# Patient Record
Sex: FEMALE | Race: WHITE | Employment: FULL TIME | URBAN - METROPOLITAN AREA
[De-identification: names, ages, dates, MRNs, and addresses within clinical notes are randomized per-mention and may not be internally consistent; named-entity substitution may affect disease eponyms.]

---

## 2021-02-03 ENCOUNTER — IMMUNIZATION (OUTPATIENT)
Dept: PRIMARY CARE CLINIC | Age: 81
End: 2021-02-03
Payer: COMMERCIAL

## 2021-02-03 PROCEDURE — 91301 COVID-19, MODERNA VACCINE 100MCG/0.5ML DOSE: CPT | Performed by: NURSE PRACTITIONER

## 2021-02-03 PROCEDURE — 0011A COVID-19, MODERNA VACCINE 100MCG/0.5ML DOSE: CPT | Performed by: NURSE PRACTITIONER

## 2021-03-03 ENCOUNTER — IMMUNIZATION (OUTPATIENT)
Dept: PRIMARY CARE CLINIC | Age: 81
End: 2021-03-03
Payer: COMMERCIAL

## 2021-03-03 PROCEDURE — 91301 COVID-19, MODERNA VACCINE 100MCG/0.5ML DOSE: CPT | Performed by: NURSE PRACTITIONER

## 2021-03-03 PROCEDURE — 0012A COVID-19, MODERNA VACCINE 100MCG/0.5ML DOSE: CPT | Performed by: NURSE PRACTITIONER

## 2023-04-27 ENCOUNTER — APPOINTMENT (OUTPATIENT)
Dept: GENERAL RADIOLOGY | Age: 83
DRG: 481 | End: 2023-04-27
Payer: MEDICARE

## 2023-04-27 ENCOUNTER — HOSPITAL ENCOUNTER (INPATIENT)
Age: 83
LOS: 4 days | Discharge: NURSING, REHAB OR LONG-TERM CARE | DRG: 481 | End: 2023-05-01
Attending: EMERGENCY MEDICINE | Admitting: INTERNAL MEDICINE
Payer: MEDICARE

## 2023-04-27 ENCOUNTER — APPOINTMENT (OUTPATIENT)
Dept: CT IMAGING | Age: 83
DRG: 481 | End: 2023-04-27
Payer: MEDICARE
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
## 2023-04-27 DIAGNOSIS — S72.141A CLOSED INTERTROCHANTERIC FRACTURE OF HIP, RIGHT, INITIAL ENCOUNTER (HCC): ICD-10-CM

## 2023-04-27 DIAGNOSIS — S72.001A CLOSED FRACTURE OF RIGHT HIP, INITIAL ENCOUNTER (HCC): ICD-10-CM

## 2023-04-27 DIAGNOSIS — G89.18 POST-OP PAIN: ICD-10-CM

## 2023-04-27 LAB
ALBUMIN SERPL-MCNC: 3.5 G/DL (ref 3.5–5.2)
ALP SERPL-CCNC: 201 U/L (ref 35–104)
ALT SERPL-CCNC: 81 U/L (ref 0–32)
ANION GAP SERPL CALCULATED.3IONS-SCNC: 5 MMOL/L (ref 7–16)
AST SERPL-CCNC: 75 U/L (ref 0–31)
BASOPHILS # BLD: 0.06 E9/L (ref 0–0.2)
BASOPHILS NFR BLD: 0.7 % (ref 0–2)
BILIRUB SERPL-MCNC: 0.4 MG/DL (ref 0–1.2)
BUN SERPL-MCNC: 16 MG/DL (ref 6–23)
CALCIUM SERPL-MCNC: 10 MG/DL (ref 8.6–10.2)
CHLORIDE SERPL-SCNC: 104 MMOL/L (ref 98–107)
CO2 SERPL-SCNC: 30 MMOL/L (ref 22–29)
CREAT SERPL-MCNC: 0.7 MG/DL (ref 0.5–1)
EOSINOPHIL # BLD: 0.04 E9/L (ref 0.05–0.5)
EOSINOPHIL NFR BLD: 0.4 % (ref 0–6)
ERYTHROCYTE [DISTWIDTH] IN BLOOD BY AUTOMATED COUNT: 13.5 FL (ref 11.5–15)
GLUCOSE SERPL-MCNC: 135 MG/DL (ref 74–99)
HCT VFR BLD AUTO: 40.6 % (ref 34–48)
HGB BLD-MCNC: 12.9 G/DL (ref 11.5–15.5)
IMM GRANULOCYTES # BLD: 0.04 E9/L
IMM GRANULOCYTES NFR BLD: 0.4 % (ref 0–5)
INR BLD: 1
LYMPHOCYTES # BLD: 1.13 E9/L (ref 1.5–4)
LYMPHOCYTES NFR BLD: 12.5 % (ref 20–42)
MCH RBC QN AUTO: 31.4 PG (ref 26–35)
MCHC RBC AUTO-ENTMCNC: 31.8 % (ref 32–34.5)
MCV RBC AUTO: 98.8 FL (ref 80–99.9)
MONOCYTES # BLD: 0.77 E9/L (ref 0.1–0.95)
MONOCYTES NFR BLD: 8.5 % (ref 2–12)
NEUTROPHILS # BLD: 7 E9/L (ref 1.8–7.3)
NEUTS SEG NFR BLD: 77.5 % (ref 43–80)
PLATELET # BLD AUTO: 255 E9/L (ref 130–450)
PMV BLD AUTO: 8.2 FL (ref 7–12)
POTASSIUM SERPL-SCNC: 3.8 MMOL/L (ref 3.5–5)
PROT SERPL-MCNC: 5.5 G/DL (ref 6.4–8.3)
PROTHROMBIN TIME: 11.4 SEC (ref 9.3–12.4)
RBC # BLD AUTO: 4.11 E12/L (ref 3.5–5.5)
SODIUM SERPL-SCNC: 139 MMOL/L (ref 132–146)
WBC # BLD: 9 E9/L (ref 4.5–11.5)

## 2023-04-27 PROCEDURE — 70450 CT HEAD/BRAIN W/O DYE: CPT

## 2023-04-27 PROCEDURE — 73552 X-RAY EXAM OF FEMUR 2/>: CPT

## 2023-04-27 PROCEDURE — 99231 SBSQ HOSP IP/OBS SF/LOW 25: CPT | Performed by: ORTHOPAEDIC SURGERY

## 2023-04-27 PROCEDURE — 99285 EMERGENCY DEPT VISIT HI MDM: CPT

## 2023-04-27 PROCEDURE — 72125 CT NECK SPINE W/O DYE: CPT

## 2023-04-27 PROCEDURE — 1200000000 HC SEMI PRIVATE

## 2023-04-27 PROCEDURE — 80053 COMPREHEN METABOLIC PANEL: CPT

## 2023-04-27 PROCEDURE — 85025 COMPLETE CBC W/AUTO DIFF WBC: CPT

## 2023-04-27 PROCEDURE — 85610 PROTHROMBIN TIME: CPT

## 2023-04-27 PROCEDURE — 71045 X-RAY EXAM CHEST 1 VIEW: CPT

## 2023-04-27 PROCEDURE — 73502 X-RAY EXAM HIP UNI 2-3 VIEWS: CPT

## 2023-04-27 RX ORDER — 0.9 % SODIUM CHLORIDE 0.9 %
500 INTRAVENOUS SOLUTION INTRAVENOUS ONCE
Status: DISCONTINUED | OUTPATIENT
Start: 2023-04-27 | End: 2023-04-28

## 2023-04-27 RX ORDER — FENTANYL CITRATE 0.05 MG/ML
25 INJECTION, SOLUTION INTRAMUSCULAR; INTRAVENOUS ONCE
Status: DISCONTINUED | OUTPATIENT
Start: 2023-04-28 | End: 2023-04-27 | Stop reason: SDUPTHER

## 2023-04-27 RX ORDER — FENTANYL CITRATE 0.05 MG/ML
25 INJECTION, SOLUTION INTRAMUSCULAR; INTRAVENOUS ONCE
Status: COMPLETED | OUTPATIENT
Start: 2023-04-28 | End: 2023-04-28

## 2023-04-27 ASSESSMENT — PAIN DESCRIPTION - DESCRIPTORS: DESCRIPTORS: STABBING

## 2023-04-27 ASSESSMENT — PAIN DESCRIPTION - ORIENTATION: ORIENTATION: RIGHT

## 2023-04-27 ASSESSMENT — PAIN SCALES - GENERAL: PAINLEVEL_OUTOF10: 10

## 2023-04-27 ASSESSMENT — PAIN - FUNCTIONAL ASSESSMENT
PAIN_FUNCTIONAL_ASSESSMENT: NONE - DENIES PAIN
PAIN_FUNCTIONAL_ASSESSMENT: 0-10

## 2023-04-27 ASSESSMENT — PAIN DESCRIPTION - LOCATION: LOCATION: HIP

## 2023-04-28 ENCOUNTER — APPOINTMENT (OUTPATIENT)
Dept: GENERAL RADIOLOGY | Age: 83
DRG: 481 | End: 2023-04-28
Payer: MEDICARE

## 2023-04-28 ENCOUNTER — ANESTHESIA EVENT (OUTPATIENT)
Dept: OPERATING ROOM | Age: 83
End: 2023-04-28
Payer: MEDICARE

## 2023-04-28 ENCOUNTER — ANESTHESIA (OUTPATIENT)
Dept: OPERATING ROOM | Age: 83
End: 2023-04-28
Payer: MEDICARE

## 2023-04-28 LAB
ABO + RH BLD: NORMAL
ALBUMIN SERPL-MCNC: 3.4 G/DL (ref 3.5–5.2)
ALP SERPL-CCNC: 185 U/L (ref 35–104)
ALT SERPL-CCNC: 72 U/L (ref 0–32)
ANION GAP SERPL CALCULATED.3IONS-SCNC: 6 MMOL/L (ref 7–16)
AST SERPL-CCNC: 54 U/L (ref 0–31)
BASOPHILS # BLD: 0.04 E9/L (ref 0–0.2)
BASOPHILS NFR BLD: 0.4 % (ref 0–2)
BILIRUB SERPL-MCNC: 0.4 MG/DL (ref 0–1.2)
BLD GP AB SCN SERPL QL: NORMAL
BUN SERPL-MCNC: 15 MG/DL (ref 6–23)
CALCIUM SERPL-MCNC: 9.8 MG/DL (ref 8.6–10.2)
CHLORIDE SERPL-SCNC: 106 MMOL/L (ref 98–107)
CO2 SERPL-SCNC: 30 MMOL/L (ref 22–29)
CREAT SERPL-MCNC: 0.5 MG/DL (ref 0.5–1)
EKG ATRIAL RATE: 75 BPM
EKG P AXIS: 86 DEGREES
EKG P-R INTERVAL: 176 MS
EKG Q-T INTERVAL: 386 MS
EKG QRS DURATION: 90 MS
EKG QTC CALCULATION (BAZETT): 431 MS
EKG R AXIS: 51 DEGREES
EKG T AXIS: 81 DEGREES
EKG VENTRICULAR RATE: 75 BPM
EOSINOPHIL # BLD: 0 E9/L (ref 0.05–0.5)
EOSINOPHIL NFR BLD: 0 % (ref 0–6)
ERYTHROCYTE [DISTWIDTH] IN BLOOD BY AUTOMATED COUNT: 13.2 FL (ref 11.5–15)
GLUCOSE SERPL-MCNC: 130 MG/DL (ref 74–99)
HCT VFR BLD AUTO: 40.5 % (ref 34–48)
HGB BLD-MCNC: 13 G/DL (ref 11.5–15.5)
IMM GRANULOCYTES # BLD: 0.05 E9/L
IMM GRANULOCYTES NFR BLD: 0.5 % (ref 0–5)
LYMPHOCYTES # BLD: 0.91 E9/L (ref 1.5–4)
LYMPHOCYTES NFR BLD: 9.4 % (ref 20–42)
MAGNESIUM SERPL-MCNC: 2 MG/DL (ref 1.6–2.6)
MCH RBC QN AUTO: 31.9 PG (ref 26–35)
MCHC RBC AUTO-ENTMCNC: 32.1 % (ref 32–34.5)
MCV RBC AUTO: 99.3 FL (ref 80–99.9)
MONOCYTES # BLD: 0.72 E9/L (ref 0.1–0.95)
MONOCYTES NFR BLD: 7.5 % (ref 2–12)
NEUTROPHILS # BLD: 7.93 E9/L (ref 1.8–7.3)
NEUTS SEG NFR BLD: 82.2 % (ref 43–80)
PHOSPHATE SERPL-MCNC: 2.4 MG/DL (ref 2.5–4.5)
PLATELET # BLD AUTO: 267 E9/L (ref 130–450)
PMV BLD AUTO: 8.3 FL (ref 7–12)
POTASSIUM SERPL-SCNC: 4 MMOL/L (ref 3.5–5)
PROT SERPL-MCNC: 5.4 G/DL (ref 6.4–8.3)
RBC # BLD AUTO: 4.08 E12/L (ref 3.5–5.5)
SODIUM SERPL-SCNC: 142 MMOL/L (ref 132–146)
T4 FREE SERPL-MCNC: 1.03 NG/DL (ref 0.93–1.7)
TSH SERPL-MCNC: 9.14 UIU/ML (ref 0.27–4.2)
WBC # BLD: 9.7 E9/L (ref 4.5–11.5)

## 2023-04-28 PROCEDURE — 27245 TREAT THIGH FRACTURE: CPT | Performed by: ORTHOPAEDIC SURGERY

## 2023-04-28 PROCEDURE — 6360000002 HC RX W HCPCS: Performed by: INTERNAL MEDICINE

## 2023-04-28 PROCEDURE — 36415 COLL VENOUS BLD VENIPUNCTURE: CPT

## 2023-04-28 PROCEDURE — 93010 ELECTROCARDIOGRAM REPORT: CPT | Performed by: INTERNAL MEDICINE

## 2023-04-28 PROCEDURE — 2580000003 HC RX 258: Performed by: ORTHOPAEDIC SURGERY

## 2023-04-28 PROCEDURE — 93005 ELECTROCARDIOGRAM TRACING: CPT | Performed by: ORTHOPAEDIC SURGERY

## 2023-04-28 PROCEDURE — 85025 COMPLETE CBC W/AUTO DIFF WBC: CPT

## 2023-04-28 PROCEDURE — 6360000002 HC RX W HCPCS: Performed by: NURSE ANESTHETIST, CERTIFIED REGISTERED

## 2023-04-28 PROCEDURE — 7100000001 HC PACU RECOVERY - ADDTL 15 MIN: Performed by: ORTHOPAEDIC SURGERY

## 2023-04-28 PROCEDURE — 2709999900 HC NON-CHARGEABLE SUPPLY: Performed by: ORTHOPAEDIC SURGERY

## 2023-04-28 PROCEDURE — 86901 BLOOD TYPING SEROLOGIC RH(D): CPT

## 2023-04-28 PROCEDURE — 6370000000 HC RX 637 (ALT 250 FOR IP): Performed by: ORTHOPAEDIC SURGERY

## 2023-04-28 PROCEDURE — 6370000000 HC RX 637 (ALT 250 FOR IP)

## 2023-04-28 PROCEDURE — 3209999900 FLUORO FOR SURGICAL PROCEDURES

## 2023-04-28 PROCEDURE — 2720000010 HC SURG SUPPLY STERILE: Performed by: ORTHOPAEDIC SURGERY

## 2023-04-28 PROCEDURE — 84439 ASSAY OF FREE THYROXINE: CPT

## 2023-04-28 PROCEDURE — 6360000002 HC RX W HCPCS: Performed by: ANESTHESIOLOGY

## 2023-04-28 PROCEDURE — 6360000002 HC RX W HCPCS: Performed by: ORTHOPAEDIC SURGERY

## 2023-04-28 PROCEDURE — 6360000002 HC RX W HCPCS

## 2023-04-28 PROCEDURE — 84443 ASSAY THYROID STIM HORMONE: CPT

## 2023-04-28 PROCEDURE — 3600000014 HC SURGERY LEVEL 4 ADDTL 15MIN: Performed by: ORTHOPAEDIC SURGERY

## 2023-04-28 PROCEDURE — 0QH606Z INSERTION OF INTRAMEDULLARY INTERNAL FIXATION DEVICE INTO RIGHT UPPER FEMUR, OPEN APPROACH: ICD-10-PCS | Performed by: ORTHOPAEDIC SURGERY

## 2023-04-28 PROCEDURE — 83735 ASSAY OF MAGNESIUM: CPT

## 2023-04-28 PROCEDURE — 3700000001 HC ADD 15 MINUTES (ANESTHESIA): Performed by: ORTHOPAEDIC SURGERY

## 2023-04-28 PROCEDURE — 93005 ELECTROCARDIOGRAM TRACING: CPT

## 2023-04-28 PROCEDURE — 80053 COMPREHEN METABOLIC PANEL: CPT

## 2023-04-28 PROCEDURE — C1713 ANCHOR/SCREW BN/BN,TIS/BN: HCPCS | Performed by: ORTHOPAEDIC SURGERY

## 2023-04-28 PROCEDURE — 2580000003 HC RX 258: Performed by: INTERNAL MEDICINE

## 2023-04-28 PROCEDURE — 2580000003 HC RX 258: Performed by: NURSE ANESTHETIST, CERTIFIED REGISTERED

## 2023-04-28 PROCEDURE — 3600000004 HC SURGERY LEVEL 4 BASE: Performed by: ORTHOPAEDIC SURGERY

## 2023-04-28 PROCEDURE — 84100 ASSAY OF PHOSPHORUS: CPT

## 2023-04-28 PROCEDURE — 86900 BLOOD TYPING SEROLOGIC ABO: CPT

## 2023-04-28 PROCEDURE — 86850 RBC ANTIBODY SCREEN: CPT

## 2023-04-28 PROCEDURE — C1769 GUIDE WIRE: HCPCS | Performed by: ORTHOPAEDIC SURGERY

## 2023-04-28 PROCEDURE — 1200000000 HC SEMI PRIVATE

## 2023-04-28 PROCEDURE — 7100000000 HC PACU RECOVERY - FIRST 15 MIN: Performed by: ORTHOPAEDIC SURGERY

## 2023-04-28 PROCEDURE — 3700000000 HC ANESTHESIA ATTENDED CARE: Performed by: ORTHOPAEDIC SURGERY

## 2023-04-28 DEVICE — LONG NAIL KIT R1.5, TI, RIGHT
Type: IMPLANTABLE DEVICE | Site: HIP | Status: FUNCTIONAL
Brand: GAMMA

## 2023-04-28 DEVICE — LAG SCREW, TI
Type: IMPLANTABLE DEVICE | Site: HIP | Status: FUNCTIONAL
Brand: GAMMA

## 2023-04-28 DEVICE — LOCKING SCREW, FULLY THREADED: Type: IMPLANTABLE DEVICE | Site: HIP | Status: FUNCTIONAL

## 2023-04-28 RX ORDER — OXYCODONE HYDROCHLORIDE 5 MG/1
2.5 TABLET ORAL EVERY 6 HOURS PRN
Qty: 10 TABLET | Refills: 0 | DISCHARGE
Start: 2023-04-28 | End: 2023-05-03

## 2023-04-28 RX ORDER — PROPOFOL 10 MG/ML
INJECTION, EMULSION INTRAVENOUS CONTINUOUS PRN
Status: DISCONTINUED | OUTPATIENT
Start: 2023-04-28 | End: 2023-04-28 | Stop reason: SDUPTHER

## 2023-04-28 RX ORDER — HYDRALAZINE HYDROCHLORIDE 20 MG/ML
INJECTION INTRAMUSCULAR; INTRAVENOUS
Status: COMPLETED
Start: 2023-04-28 | End: 2023-04-28

## 2023-04-28 RX ORDER — KETOROLAC TROMETHAMINE 15 MG/ML
15 INJECTION, SOLUTION INTRAMUSCULAR; INTRAVENOUS
Status: DISCONTINUED | OUTPATIENT
Start: 2023-04-28 | End: 2023-04-28 | Stop reason: HOSPADM

## 2023-04-28 RX ORDER — CEFAZOLIN SODIUM 2 G/50ML
SOLUTION INTRAVENOUS PRN
Status: DISCONTINUED | OUTPATIENT
Start: 2023-04-28 | End: 2023-04-28 | Stop reason: SDUPTHER

## 2023-04-28 RX ORDER — WATER 1000 ML/1000ML
INJECTION, SOLUTION INTRAVENOUS
Status: DISPENSED
Start: 2023-04-28 | End: 2023-04-28

## 2023-04-28 RX ORDER — SODIUM CHLORIDE 9 MG/ML
INJECTION, SOLUTION INTRAVENOUS PRN
Status: DISCONTINUED | OUTPATIENT
Start: 2023-04-28 | End: 2023-05-01 | Stop reason: HOSPADM

## 2023-04-28 RX ORDER — SODIUM CHLORIDE 9 MG/ML
INJECTION, SOLUTION INTRAVENOUS CONTINUOUS PRN
Status: DISCONTINUED | OUTPATIENT
Start: 2023-04-28 | End: 2023-04-28 | Stop reason: SDUPTHER

## 2023-04-28 RX ORDER — LEVOTHYROXINE SODIUM 0.07 MG/1
75 TABLET ORAL DAILY
Status: DISCONTINUED | OUTPATIENT
Start: 2023-04-28 | End: 2023-04-28

## 2023-04-28 RX ORDER — CEFAZOLIN 2 G/1
INJECTION, POWDER, FOR SOLUTION INTRAMUSCULAR; INTRAVENOUS
Status: DISPENSED
Start: 2023-04-28 | End: 2023-04-28

## 2023-04-28 RX ORDER — ACETAMINOPHEN 650 MG/1
650 SUPPOSITORY RECTAL EVERY 6 HOURS PRN
Status: DISCONTINUED | OUTPATIENT
Start: 2023-04-28 | End: 2023-04-28

## 2023-04-28 RX ORDER — OXYCODONE HYDROCHLORIDE 5 MG/1
2.5 TABLET ORAL EVERY 6 HOURS PRN
Status: DISCONTINUED | OUTPATIENT
Start: 2023-04-28 | End: 2023-05-01 | Stop reason: HOSPADM

## 2023-04-28 RX ORDER — SODIUM CHLORIDE 0.9 % (FLUSH) 0.9 %
5-40 SYRINGE (ML) INJECTION PRN
Status: DISCONTINUED | OUTPATIENT
Start: 2023-04-28 | End: 2023-04-28 | Stop reason: HOSPADM

## 2023-04-28 RX ORDER — SODIUM CHLORIDE 9 MG/ML
INJECTION, SOLUTION INTRAVENOUS CONTINUOUS
Status: DISCONTINUED | OUTPATIENT
Start: 2023-04-28 | End: 2023-04-29 | Stop reason: SDUPTHER

## 2023-04-28 RX ORDER — LEVOTHYROXINE SODIUM 0.1 MG/1
100 TABLET ORAL DAILY
Status: DISCONTINUED | OUTPATIENT
Start: 2023-04-28 | End: 2023-05-01 | Stop reason: HOSPADM

## 2023-04-28 RX ORDER — LIDOCAINE HYDROCHLORIDE 20 MG/ML
INJECTION, SOLUTION INTRAVENOUS PRN
Status: DISCONTINUED | OUTPATIENT
Start: 2023-04-28 | End: 2023-04-28 | Stop reason: SDUPTHER

## 2023-04-28 RX ORDER — ACETAMINOPHEN 325 MG/1
650 TABLET ORAL EVERY 6 HOURS PRN
Status: DISCONTINUED | OUTPATIENT
Start: 2023-04-28 | End: 2023-05-01 | Stop reason: HOSPADM

## 2023-04-28 RX ORDER — DEXTROSE MONOHYDRATE 100 MG/ML
INJECTION, SOLUTION INTRAVENOUS CONTINUOUS PRN
Status: DISCONTINUED | OUTPATIENT
Start: 2023-04-28 | End: 2023-05-01 | Stop reason: HOSPADM

## 2023-04-28 RX ORDER — LEVOTHYROXINE SODIUM 0.07 MG/1
75 TABLET ORAL DAILY
Status: ON HOLD | COMMUNITY
Start: 2023-02-15 | End: 2023-05-01 | Stop reason: HOSPADM

## 2023-04-28 RX ORDER — ACETAMINOPHEN 325 MG/1
650 TABLET ORAL EVERY 6 HOURS PRN
Status: DISCONTINUED | OUTPATIENT
Start: 2023-04-28 | End: 2023-04-28

## 2023-04-28 RX ORDER — HYDRALAZINE HYDROCHLORIDE 20 MG/ML
5 INJECTION INTRAMUSCULAR; INTRAVENOUS EVERY 10 MIN PRN
Status: DISCONTINUED | OUTPATIENT
Start: 2023-04-28 | End: 2023-05-01 | Stop reason: HOSPADM

## 2023-04-28 RX ORDER — 0.9 % SODIUM CHLORIDE 0.9 %
1000 INTRAVENOUS SOLUTION INTRAVENOUS ONCE
Status: COMPLETED | OUTPATIENT
Start: 2023-04-28 | End: 2023-04-28

## 2023-04-28 RX ORDER — SODIUM CHLORIDE 0.9 % (FLUSH) 0.9 %
5-40 SYRINGE (ML) INJECTION EVERY 12 HOURS SCHEDULED
Status: DISCONTINUED | OUTPATIENT
Start: 2023-04-28 | End: 2023-05-01 | Stop reason: HOSPADM

## 2023-04-28 RX ORDER — ONDANSETRON 4 MG/1
4 TABLET, ORALLY DISINTEGRATING ORAL EVERY 8 HOURS PRN
Status: DISCONTINUED | OUTPATIENT
Start: 2023-04-28 | End: 2023-05-01 | Stop reason: HOSPADM

## 2023-04-28 RX ORDER — SODIUM CHLORIDE 0.9 % (FLUSH) 0.9 %
5-40 SYRINGE (ML) INJECTION EVERY 12 HOURS SCHEDULED
Status: DISCONTINUED | OUTPATIENT
Start: 2023-04-28 | End: 2023-04-28 | Stop reason: HOSPADM

## 2023-04-28 RX ORDER — SODIUM CHLORIDE 0.9 % (FLUSH) 0.9 %
5-40 SYRINGE (ML) INJECTION PRN
Status: DISCONTINUED | OUTPATIENT
Start: 2023-04-28 | End: 2023-05-01 | Stop reason: HOSPADM

## 2023-04-28 RX ORDER — FENTANYL CITRATE 0.05 MG/ML
25 INJECTION, SOLUTION INTRAMUSCULAR; INTRAVENOUS EVERY 5 MIN PRN
Status: DISCONTINUED | OUTPATIENT
Start: 2023-04-28 | End: 2023-04-28 | Stop reason: HOSPADM

## 2023-04-28 RX ORDER — SODIUM CHLORIDE 9 MG/ML
INJECTION, SOLUTION INTRAVENOUS PRN
Status: DISCONTINUED | OUTPATIENT
Start: 2023-04-28 | End: 2023-04-28 | Stop reason: HOSPADM

## 2023-04-28 RX ORDER — FENTANYL CITRATE 50 UG/ML
INJECTION, SOLUTION INTRAMUSCULAR; INTRAVENOUS PRN
Status: DISCONTINUED | OUTPATIENT
Start: 2023-04-28 | End: 2023-04-28 | Stop reason: SDUPTHER

## 2023-04-28 RX ORDER — MORPHINE SULFATE 2 MG/ML
2 INJECTION, SOLUTION INTRAMUSCULAR; INTRAVENOUS EVERY 4 HOURS PRN
Status: DISCONTINUED | OUTPATIENT
Start: 2023-04-28 | End: 2023-05-01 | Stop reason: HOSPADM

## 2023-04-28 RX ORDER — POLYETHYLENE GLYCOL 3350 17 G/17G
17 POWDER, FOR SOLUTION ORAL DAILY PRN
Status: DISCONTINUED | OUTPATIENT
Start: 2023-04-28 | End: 2023-04-28

## 2023-04-28 RX ORDER — PROPOFOL 10 MG/ML
INJECTION, EMULSION INTRAVENOUS PRN
Status: DISCONTINUED | OUTPATIENT
Start: 2023-04-28 | End: 2023-04-28 | Stop reason: SDUPTHER

## 2023-04-28 RX ORDER — POLYETHYLENE GLYCOL 3350 17 G/17G
17 POWDER, FOR SOLUTION ORAL DAILY PRN
Status: DISCONTINUED | OUTPATIENT
Start: 2023-04-28 | End: 2023-05-01 | Stop reason: HOSPADM

## 2023-04-28 RX ORDER — ONDANSETRON 2 MG/ML
4 INJECTION INTRAMUSCULAR; INTRAVENOUS EVERY 6 HOURS PRN
Status: DISCONTINUED | OUTPATIENT
Start: 2023-04-28 | End: 2023-05-01 | Stop reason: HOSPADM

## 2023-04-28 RX ADMIN — OXYCODONE HYDROCHLORIDE 2.5 MG: 5 TABLET ORAL at 21:21

## 2023-04-28 RX ADMIN — FENTANYL CITRATE 25 MCG: 50 INJECTION, SOLUTION INTRAMUSCULAR; INTRAVENOUS at 11:59

## 2023-04-28 RX ADMIN — LIDOCAINE HYDROCHLORIDE 50 MG: 20 INJECTION, SOLUTION INTRAVENOUS at 12:05

## 2023-04-28 RX ADMIN — ONDANSETRON 4 MG: 2 INJECTION INTRAMUSCULAR; INTRAVENOUS at 16:52

## 2023-04-28 RX ADMIN — PROPOFOL 20 MG: 10 INJECTION, EMULSION INTRAVENOUS at 12:22

## 2023-04-28 RX ADMIN — LEVOTHYROXINE SODIUM 100 MCG: 100 TABLET ORAL at 20:34

## 2023-04-28 RX ADMIN — CEFAZOLIN 2000 MG: 2 INJECTION, POWDER, FOR SOLUTION INTRAMUSCULAR; INTRAVENOUS at 20:34

## 2023-04-28 RX ADMIN — SODIUM CHLORIDE 1000 ML: 9 INJECTION, SOLUTION INTRAVENOUS at 02:47

## 2023-04-28 RX ADMIN — SODIUM CHLORIDE: 900 INJECTION, SOLUTION INTRAVENOUS at 11:10

## 2023-04-28 RX ADMIN — SERTRALINE 25 MG: 50 TABLET, FILM COATED ORAL at 20:34

## 2023-04-28 RX ADMIN — HYDRALAZINE HYDROCHLORIDE 5 MG: 20 INJECTION INTRAMUSCULAR; INTRAVENOUS at 14:40

## 2023-04-28 RX ADMIN — FENTANYL CITRATE 25 MCG: 0.05 INJECTION, SOLUTION INTRAMUSCULAR; INTRAVENOUS at 00:06

## 2023-04-28 RX ADMIN — PROPOFOL 30 MG: 10 INJECTION, EMULSION INTRAVENOUS at 12:05

## 2023-04-28 RX ADMIN — PHENYLEPHRINE HYDROCHLORIDE 200 MCG: 10 INJECTION INTRAVENOUS at 12:42

## 2023-04-28 RX ADMIN — HYDRALAZINE HYDROCHLORIDE 5 MG: 20 INJECTION INTRAMUSCULAR; INTRAVENOUS at 14:18

## 2023-04-28 RX ADMIN — PROPOFOL 20 MG: 10 INJECTION, EMULSION INTRAVENOUS at 12:14

## 2023-04-28 RX ADMIN — PROPOFOL INJECTABLE EMULSION 50 MCG/KG/MIN: 10 INJECTION, EMULSION INTRAVENOUS at 12:21

## 2023-04-28 RX ADMIN — FENTANYL CITRATE 25 MCG: 50 INJECTION, SOLUTION INTRAMUSCULAR; INTRAVENOUS at 12:19

## 2023-04-28 RX ADMIN — FENTANYL CITRATE 25 MCG: 50 INJECTION, SOLUTION INTRAMUSCULAR; INTRAVENOUS at 12:04

## 2023-04-28 RX ADMIN — CEFAZOLIN SODIUM 2000 MG: 2 SOLUTION INTRAVENOUS at 12:25

## 2023-04-28 RX ADMIN — PHENYLEPHRINE HYDROCHLORIDE 100 MCG: 10 INJECTION INTRAVENOUS at 12:39

## 2023-04-28 RX ADMIN — PROPOFOL 20 MG: 10 INJECTION, EMULSION INTRAVENOUS at 12:09

## 2023-04-28 RX ADMIN — FENTANYL CITRATE 25 MCG: 50 INJECTION, SOLUTION INTRAMUSCULAR; INTRAVENOUS at 12:08

## 2023-04-28 RX ADMIN — PROPOFOL 20 MG: 10 INJECTION, EMULSION INTRAVENOUS at 13:00

## 2023-04-28 ASSESSMENT — PAIN DESCRIPTION - ORIENTATION
ORIENTATION: RIGHT
ORIENTATION: RIGHT

## 2023-04-28 ASSESSMENT — LIFESTYLE VARIABLES
HOW OFTEN DO YOU HAVE A DRINK CONTAINING ALCOHOL: NEVER
HOW MANY STANDARD DRINKS CONTAINING ALCOHOL DO YOU HAVE ON A TYPICAL DAY: PATIENT DOES NOT DRINK

## 2023-04-28 ASSESSMENT — PAIN SCALES - PAIN ASSESSMENT IN ADVANCED DEMENTIA (PAINAD)
NEGVOCALIZATION: 0
BODYLANGUAGE: 0
FACIALEXPRESSION: 0
BREATHING: 0
CONSOLABILITY: 0
TOTALSCORE: 0

## 2023-04-28 ASSESSMENT — PAIN SCALES - GENERAL
PAINLEVEL_OUTOF10: 10
PAINLEVEL_OUTOF10: 7
PAINLEVEL_OUTOF10: 0

## 2023-04-28 ASSESSMENT — PAIN DESCRIPTION - LOCATION
LOCATION: HIP;LEG
LOCATION: HIP

## 2023-04-28 NOTE — ANESTHESIA PROCEDURE NOTES
Spinal Block    Patient location during procedure: OR  End time: 4/28/2023 12:19 PM  Reason for block: primary anesthetic and at surgeon's request  Staffing  Performed: anesthesiologist   Anesthesiologist: Rody Jacobs DO  Resident/CRNA: WENDI Aguayo CRNA  Spinal Block  Patient position: sitting  Prep: Betadine and site prepped and draped  Patient monitoring: cardiac monitor, continuous pulse ox, continuous capnometry, frequent blood pressure checks and oxygen  Approach: midline  Location: L3/L4  Guidance: paresthesia technique  Provider prep: mask and sterile gloves  Local infiltration: lidocaine  Needle  Needle type: Quincke   Needle gauge: 22 G  Needle length: 3.5 in  Assessment  Sensory level: T6  Swirl obtained: Yes  CSF: clear  Attempts: 2 (1st attempt failed to reach intrathecal space by CRNA, Dr. Arthur Pena successful)  Hemodynamics: stable  Preanesthetic Checklist  Completed: patient identified, IV checked, site marked, risks and benefits discussed, surgical/procedural consents, equipment checked, pre-op evaluation, timeout performed, anesthesia consent given, oxygen available, monitors applied/VS acknowledged, fire risk safety assessment completed and verbalized and blood product R/B/A discussed and consented

## 2023-04-28 NOTE — ANESTHESIA PRE PROCEDURE
Department of Anesthesiology  Preprocedure Note       Name:  Alicia Aranda   Age:  80 y.o.  :  1940                                          MRN:  [id number]         Date:  2023      Surgeon: Flex Medina): Tash Rodriguez DO    Procedure: Procedure(s):  RIGHT HIP CEPHALOMEDULLARY NAILING    Medications prior to admission:   Prior to Admission medications    Medication Sig Start Date End Date Taking?  Authorizing Provider   levothyroxine (SYNTHROID) 75 MCG tablet Take 1 tablet by mouth daily 2/15/23   Historical Provider, MD       Current medications:    Current Facility-Administered Medications   Medication Dose Route Frequency Provider Last Rate Last Admin    ceFAZolin (ANCEF) 2,000 mg in sterile water 20 mL IV syringe  2,000 mg IntraVENous On Call to [address] [place] [address], DO        glucose chewable tablet 16 g  4 tablet Oral PRN Mallorie Gey, DO        dextrose bolus 10% 125 mL  125 mL IntraVENous PRN Mallorie Gey, DO        Or    dextrose bolus 10% 250 mL  250 mL IntraVENous PRN Mallorie Gey, DO        glucagon (rDNA) injection 1 mg  1 mg SubCUTAneous PRN Mallorie Gey, DO        dextrose 10 % infusion   IntraVENous Continuous PRN Mallorie Gey, DO        sodium chloride flush 0.9 % injection 5-40 mL  5-40 mL IntraVENous 2 times per day Mallorie Gey, DO        sodium chloride flush 0.9 % injection 5-40 mL  5-40 mL IntraVENous PRN Mallorie Gey, DO        0.9 % sodium chloride infusion   IntraVENous PRN Mallorie Gey, DO        polyethylene glycol (GLYCOLAX) packet 17 g  17 g Oral Daily PRN Mallorie Gey, DO        acetaminophen (TYLENOL) tablet 650 mg  650 mg Oral Q6H PRN Mallorie Gey, DO        Or    acetaminophen (TYLENOL) suppository 650 mg  650 mg Rectal Q6H PRN Mallorie Gey, DO        levothyroxine (SYNTHROID) tablet 100 mcg  100 mcg Oral Daily WENDI Carballo CNP        sertraline (ZOLOFT) tablet 25 mg  25 mg Oral Daily WENDI Carballo CNP

## 2023-04-28 NOTE — ANESTHESIA POSTPROCEDURE EVALUATION
Department of Anesthesiology  Postprocedure Note    Patient: Sam Floor  MRN: [id number]  YOB: 1940  Date of evaluation: 4/28/2023      Procedure Summary     Date: 04/28/23 Room / Location: [address] [place] [address] 06 / [facility]    Anesthesia Start: 1137 Anesthesia Stop: 1829    Procedure: RIGHT HIP CEPHALOMEDULLARY NAILING (Right: Hip) Diagnosis:       Closed fracture of right hip with routine healing, subsequent encounter      (Closed fracture of right hip with routine healing, subsequent encounter [S72.001D])    Surgeons: Salinas Saini DO Responsible Provider: Tawana Ahuja DO    Anesthesia Type: spinal ASA Status: 3          Anesthesia Type: No value filed.     Steve Phase I: Steve Score: 9    Steve Phase II:        Anesthesia Post Evaluation    Patient location during evaluation: PACU  Patient participation: complete - patient participated  Level of consciousness: awake and alert  Airway patency: patent  Nausea & Vomiting: no nausea and no vomiting  Complications: no  Cardiovascular status: hemodynamically stable  Respiratory status: acceptable  Hydration status: euvolemic

## 2023-04-29 LAB
ALBUMIN SERPL-MCNC: 2.9 G/DL (ref 3.5–5.2)
ALP SERPL-CCNC: 133 U/L (ref 35–104)
ALT SERPL-CCNC: 41 U/L (ref 0–32)
ANION GAP SERPL CALCULATED.3IONS-SCNC: 8 MMOL/L (ref 7–16)
AST SERPL-CCNC: 21 U/L (ref 0–31)
BASOPHILS # BLD: 0.02 E9/L (ref 0–0.2)
BASOPHILS NFR BLD: 0.3 % (ref 0–2)
BILIRUB SERPL-MCNC: 0.5 MG/DL (ref 0–1.2)
BUN SERPL-MCNC: 12 MG/DL (ref 6–23)
CALCIUM SERPL-MCNC: 9 MG/DL (ref 8.6–10.2)
CHLORIDE SERPL-SCNC: 106 MMOL/L (ref 98–107)
CO2 SERPL-SCNC: 28 MMOL/L (ref 22–29)
CREAT SERPL-MCNC: 0.5 MG/DL (ref 0.5–1)
EOSINOPHIL # BLD: 0 E9/L (ref 0.05–0.5)
EOSINOPHIL NFR BLD: 0 % (ref 0–6)
ERYTHROCYTE [DISTWIDTH] IN BLOOD BY AUTOMATED COUNT: 13.6 FL (ref 11.5–15)
GLUCOSE SERPL-MCNC: 126 MG/DL (ref 74–99)
HCT VFR BLD AUTO: 33.5 % (ref 34–48)
HGB BLD-MCNC: 10.8 G/DL (ref 11.5–15.5)
IMM GRANULOCYTES # BLD: 0.06 E9/L
IMM GRANULOCYTES NFR BLD: 0.8 % (ref 0–5)
LYMPHOCYTES # BLD: 0.83 E9/L (ref 1.5–4)
LYMPHOCYTES NFR BLD: 10.7 % (ref 20–42)
MCH RBC QN AUTO: 31.9 PG (ref 26–35)
MCHC RBC AUTO-ENTMCNC: 32.2 % (ref 32–34.5)
MCV RBC AUTO: 98.8 FL (ref 80–99.9)
MONOCYTES # BLD: 0.91 E9/L (ref 0.1–0.95)
MONOCYTES NFR BLD: 11.7 % (ref 2–12)
NEUTROPHILS # BLD: 5.94 E9/L (ref 1.8–7.3)
NEUTS SEG NFR BLD: 76.5 % (ref 43–80)
PLATELET # BLD AUTO: 212 E9/L (ref 130–450)
PMV BLD AUTO: 9 FL (ref 7–12)
POTASSIUM SERPL-SCNC: 3.7 MMOL/L (ref 3.5–5)
PROT SERPL-MCNC: 4.8 G/DL (ref 6.4–8.3)
RBC # BLD AUTO: 3.39 E12/L (ref 3.5–5.5)
SODIUM SERPL-SCNC: 142 MMOL/L (ref 132–146)
TROPONIN, HIGH SENSITIVITY: 8 NG/L (ref 0–9)
WBC # BLD: 7.8 E9/L (ref 4.5–11.5)

## 2023-04-29 PROCEDURE — 97110 THERAPEUTIC EXERCISES: CPT

## 2023-04-29 PROCEDURE — 6370000000 HC RX 637 (ALT 250 FOR IP)

## 2023-04-29 PROCEDURE — 6370000000 HC RX 637 (ALT 250 FOR IP): Performed by: ORTHOPAEDIC SURGERY

## 2023-04-29 PROCEDURE — 85025 COMPLETE CBC W/AUTO DIFF WBC: CPT

## 2023-04-29 PROCEDURE — 84484 ASSAY OF TROPONIN QUANT: CPT

## 2023-04-29 PROCEDURE — 2580000003 HC RX 258: Performed by: NURSE PRACTITIONER

## 2023-04-29 PROCEDURE — 80053 COMPREHEN METABOLIC PANEL: CPT

## 2023-04-29 PROCEDURE — 36415 COLL VENOUS BLD VENIPUNCTURE: CPT

## 2023-04-29 PROCEDURE — 97161 PT EVAL LOW COMPLEX 20 MIN: CPT

## 2023-04-29 PROCEDURE — 97530 THERAPEUTIC ACTIVITIES: CPT

## 2023-04-29 PROCEDURE — 6360000002 HC RX W HCPCS: Performed by: ORTHOPAEDIC SURGERY

## 2023-04-29 PROCEDURE — 2700000000 HC OXYGEN THERAPY PER DAY

## 2023-04-29 PROCEDURE — 1200000000 HC SEMI PRIVATE

## 2023-04-29 PROCEDURE — 2580000003 HC RX 258: Performed by: ORTHOPAEDIC SURGERY

## 2023-04-29 RX ORDER — SODIUM CHLORIDE 9 MG/ML
INJECTION, SOLUTION INTRAVENOUS CONTINUOUS
Status: DISCONTINUED | OUTPATIENT
Start: 2023-04-29 | End: 2023-05-01 | Stop reason: HOSPADM

## 2023-04-29 RX ADMIN — LEVOTHYROXINE SODIUM 100 MCG: 100 TABLET ORAL at 05:39

## 2023-04-29 RX ADMIN — ASPIRIN 325 MG: 325 TABLET, COATED ORAL at 09:35

## 2023-04-29 RX ADMIN — OXYCODONE HYDROCHLORIDE 2.5 MG: 5 TABLET ORAL at 21:01

## 2023-04-29 RX ADMIN — OXYCODONE HYDROCHLORIDE 2.5 MG: 5 TABLET ORAL at 05:39

## 2023-04-29 RX ADMIN — SODIUM CHLORIDE: 9 INJECTION, SOLUTION INTRAVENOUS at 21:00

## 2023-04-29 RX ADMIN — CEFAZOLIN 2000 MG: 2 INJECTION, POWDER, FOR SOLUTION INTRAMUSCULAR; INTRAVENOUS at 05:39

## 2023-04-29 RX ADMIN — SERTRALINE 25 MG: 50 TABLET, FILM COATED ORAL at 09:36

## 2023-04-29 RX ADMIN — ACETAMINOPHEN 650 MG: 325 TABLET ORAL at 09:38

## 2023-04-29 RX ADMIN — SODIUM CHLORIDE, PRESERVATIVE FREE 10 ML: 5 INJECTION INTRAVENOUS at 21:00

## 2023-04-29 RX ADMIN — ASPIRIN 325 MG: 325 TABLET, COATED ORAL at 21:00

## 2023-04-29 RX ADMIN — SODIUM CHLORIDE: 9 INJECTION, SOLUTION INTRAVENOUS at 01:39

## 2023-04-29 RX ADMIN — SODIUM CHLORIDE: 9 INJECTION, SOLUTION INTRAVENOUS at 09:42

## 2023-04-29 ASSESSMENT — PAIN DESCRIPTION - DESCRIPTORS: DESCRIPTORS: SORE

## 2023-04-29 ASSESSMENT — PAIN SCALES - PAIN ASSESSMENT IN ADVANCED DEMENTIA (PAINAD)
TOTALSCORE: 5
FACIALEXPRESSION: 1
NEGVOCALIZATION: 1
BODYLANGUAGE: 1
CONSOLABILITY: 1
BREATHING: 1

## 2023-04-29 ASSESSMENT — PAIN DESCRIPTION - LOCATION
LOCATION: ARM;HIP
LOCATION: HIP;LEG

## 2023-04-29 ASSESSMENT — PAIN DESCRIPTION - ORIENTATION
ORIENTATION: RIGHT
ORIENTATION: LEFT

## 2023-04-29 ASSESSMENT — PAIN SCALES - GENERAL
PAINLEVEL_OUTOF10: 7
PAINLEVEL_OUTOF10: 7
PAINLEVEL_OUTOF10: 5

## 2023-04-29 ASSESSMENT — PAIN - FUNCTIONAL ASSESSMENT: PAIN_FUNCTIONAL_ASSESSMENT: PREVENTS OR INTERFERES SOME ACTIVE ACTIVITIES AND ADLS

## 2023-04-30 PROBLEM — S72.009A HIP FRACTURE (HCC): Status: ACTIVE | Noted: 2023-04-30

## 2023-04-30 LAB
ALBUMIN SERPL-MCNC: 2.7 G/DL (ref 3.5–5.2)
ALP SERPL-CCNC: 112 U/L (ref 35–104)
ALT SERPL-CCNC: 24 U/L (ref 0–32)
ANION GAP SERPL CALCULATED.3IONS-SCNC: 5 MMOL/L (ref 7–16)
AST SERPL-CCNC: 15 U/L (ref 0–31)
BASOPHILS # BLD: 0.03 E9/L (ref 0–0.2)
BASOPHILS NFR BLD: 0.4 % (ref 0–2)
BILIRUB SERPL-MCNC: 0.4 MG/DL (ref 0–1.2)
BUN SERPL-MCNC: 11 MG/DL (ref 6–23)
CALCIUM SERPL-MCNC: 9.1 MG/DL (ref 8.6–10.2)
CHLORIDE SERPL-SCNC: 109 MMOL/L (ref 98–107)
CO2 SERPL-SCNC: 27 MMOL/L (ref 22–29)
CREAT SERPL-MCNC: 0.4 MG/DL (ref 0.5–1)
EOSINOPHIL # BLD: 0.03 E9/L (ref 0.05–0.5)
EOSINOPHIL NFR BLD: 0.4 % (ref 0–6)
ERYTHROCYTE [DISTWIDTH] IN BLOOD BY AUTOMATED COUNT: 13.5 FL (ref 11.5–15)
GLUCOSE SERPL-MCNC: 95 MG/DL (ref 74–99)
HCT VFR BLD AUTO: 30.8 % (ref 34–48)
HGB BLD-MCNC: 9.8 G/DL (ref 11.5–15.5)
IMM GRANULOCYTES # BLD: 0.03 E9/L
IMM GRANULOCYTES NFR BLD: 0.4 % (ref 0–5)
LYMPHOCYTES # BLD: 0.84 E9/L (ref 1.5–4)
LYMPHOCYTES NFR BLD: 11.4 % (ref 20–42)
MCH RBC QN AUTO: 32.2 PG (ref 26–35)
MCHC RBC AUTO-ENTMCNC: 31.8 % (ref 32–34.5)
MCV RBC AUTO: 101.3 FL (ref 80–99.9)
MONOCYTES # BLD: 0.92 E9/L (ref 0.1–0.95)
MONOCYTES NFR BLD: 12.5 % (ref 2–12)
NEUTROPHILS # BLD: 5.52 E9/L (ref 1.8–7.3)
NEUTS SEG NFR BLD: 74.9 % (ref 43–80)
PLATELET # BLD AUTO: 195 E9/L (ref 130–450)
PMV BLD AUTO: 9.1 FL (ref 7–12)
POTASSIUM SERPL-SCNC: 3.8 MMOL/L (ref 3.5–5)
PROT SERPL-MCNC: 4.6 G/DL (ref 6.4–8.3)
RBC # BLD AUTO: 3.04 E12/L (ref 3.5–5.5)
SODIUM SERPL-SCNC: 141 MMOL/L (ref 132–146)
WBC # BLD: 7.4 E9/L (ref 4.5–11.5)

## 2023-04-30 PROCEDURE — 97110 THERAPEUTIC EXERCISES: CPT

## 2023-04-30 PROCEDURE — 80053 COMPREHEN METABOLIC PANEL: CPT

## 2023-04-30 PROCEDURE — P9047 ALBUMIN (HUMAN), 25%, 50ML: HCPCS | Performed by: INTERNAL MEDICINE

## 2023-04-30 PROCEDURE — 97530 THERAPEUTIC ACTIVITIES: CPT

## 2023-04-30 PROCEDURE — 2580000003 HC RX 258: Performed by: ORTHOPAEDIC SURGERY

## 2023-04-30 PROCEDURE — 6370000000 HC RX 637 (ALT 250 FOR IP)

## 2023-04-30 PROCEDURE — 6360000002 HC RX W HCPCS: Performed by: INTERNAL MEDICINE

## 2023-04-30 PROCEDURE — 36415 COLL VENOUS BLD VENIPUNCTURE: CPT

## 2023-04-30 PROCEDURE — 85025 COMPLETE CBC W/AUTO DIFF WBC: CPT

## 2023-04-30 PROCEDURE — 6370000000 HC RX 637 (ALT 250 FOR IP): Performed by: ORTHOPAEDIC SURGERY

## 2023-04-30 PROCEDURE — 97165 OT EVAL LOW COMPLEX 30 MIN: CPT

## 2023-04-30 PROCEDURE — 1200000000 HC SEMI PRIVATE

## 2023-04-30 RX ORDER — ALBUMIN (HUMAN) 12.5 G/50ML
25 SOLUTION INTRAVENOUS ONCE
Status: COMPLETED | OUTPATIENT
Start: 2023-04-30 | End: 2023-04-30

## 2023-04-30 RX ADMIN — OXYCODONE HYDROCHLORIDE 2.5 MG: 5 TABLET ORAL at 20:24

## 2023-04-30 RX ADMIN — ALBUMIN (HUMAN) 25 G: 0.25 INJECTION, SOLUTION INTRAVENOUS at 17:52

## 2023-04-30 RX ADMIN — SODIUM CHLORIDE, PRESERVATIVE FREE 10 ML: 5 INJECTION INTRAVENOUS at 20:26

## 2023-04-30 RX ADMIN — LEVOTHYROXINE SODIUM 100 MCG: 100 TABLET ORAL at 05:53

## 2023-04-30 RX ADMIN — OXYCODONE HYDROCHLORIDE 2.5 MG: 5 TABLET ORAL at 09:55

## 2023-04-30 RX ADMIN — ASPIRIN 325 MG: 325 TABLET, COATED ORAL at 20:24

## 2023-04-30 RX ADMIN — SERTRALINE 25 MG: 50 TABLET, FILM COATED ORAL at 09:54

## 2023-04-30 RX ADMIN — ASPIRIN 325 MG: 325 TABLET, COATED ORAL at 09:54

## 2023-04-30 ASSESSMENT — PAIN DESCRIPTION - FREQUENCY: FREQUENCY: CONTINUOUS

## 2023-04-30 ASSESSMENT — PAIN - FUNCTIONAL ASSESSMENT
PAIN_FUNCTIONAL_ASSESSMENT: PREVENTS OR INTERFERES SOME ACTIVE ACTIVITIES AND ADLS
PAIN_FUNCTIONAL_ASSESSMENT: PREVENTS OR INTERFERES SOME ACTIVE ACTIVITIES AND ADLS

## 2023-04-30 ASSESSMENT — PAIN DESCRIPTION - PAIN TYPE: TYPE: ACUTE PAIN;SURGICAL PAIN

## 2023-04-30 ASSESSMENT — PAIN DESCRIPTION - ORIENTATION
ORIENTATION: RIGHT;LEFT
ORIENTATION: RIGHT

## 2023-04-30 ASSESSMENT — PAIN DESCRIPTION - DESCRIPTORS
DESCRIPTORS: ACHING;DISCOMFORT;TENDER;SORE
DESCRIPTORS: TENDER;SORE;DISCOMFORT

## 2023-04-30 ASSESSMENT — PAIN DESCRIPTION - LOCATION
LOCATION: HIP
LOCATION: HIP;SHOULDER

## 2023-04-30 ASSESSMENT — PAIN SCALES - GENERAL
PAINLEVEL_OUTOF10: 7
PAINLEVEL_OUTOF10: 8

## 2023-04-30 ASSESSMENT — PAIN DESCRIPTION - ONSET: ONSET: ON-GOING

## 2023-05-01 VITALS
SYSTOLIC BLOOD PRESSURE: 180 MMHG | RESPIRATION RATE: 17 BRPM | WEIGHT: 93 LBS | TEMPERATURE: 98.8 F | OXYGEN SATURATION: 93 % | BODY MASS INDEX: 18.26 KG/M2 | HEART RATE: 72 BPM | DIASTOLIC BLOOD PRESSURE: 71 MMHG | HEIGHT: 60 IN

## 2023-05-01 LAB
ALBUMIN SERPL-MCNC: 3.2 G/DL (ref 3.5–5.2)
ALP SERPL-CCNC: 107 U/L (ref 35–104)
ALT SERPL-CCNC: 18 U/L (ref 0–32)
ANION GAP SERPL CALCULATED.3IONS-SCNC: 10 MMOL/L (ref 7–16)
AST SERPL-CCNC: 14 U/L (ref 0–31)
BASOPHILS # BLD: 0.03 E9/L (ref 0–0.2)
BASOPHILS NFR BLD: 0.4 % (ref 0–2)
BILIRUB SERPL-MCNC: 0.5 MG/DL (ref 0–1.2)
BUN SERPL-MCNC: 11 MG/DL (ref 6–23)
CALCIUM SERPL-MCNC: 9 MG/DL (ref 8.6–10.2)
CHLORIDE SERPL-SCNC: 107 MMOL/L (ref 98–107)
CO2 SERPL-SCNC: 24 MMOL/L (ref 22–29)
CREAT SERPL-MCNC: 0.3 MG/DL (ref 0.5–1)
EKG ATRIAL RATE: 86 BPM
EKG P AXIS: 78 DEGREES
EKG P-R INTERVAL: 156 MS
EKG Q-T INTERVAL: 352 MS
EKG QRS DURATION: 84 MS
EKG QTC CALCULATION (BAZETT): 421 MS
EKG R AXIS: 3 DEGREES
EKG T AXIS: 79 DEGREES
EKG VENTRICULAR RATE: 86 BPM
EOSINOPHIL # BLD: 0.13 E9/L (ref 0.05–0.5)
EOSINOPHIL NFR BLD: 1.9 % (ref 0–6)
ERYTHROCYTE [DISTWIDTH] IN BLOOD BY AUTOMATED COUNT: 13 FL (ref 11.5–15)
GLUCOSE SERPL-MCNC: 95 MG/DL (ref 74–99)
HCT VFR BLD AUTO: 27.5 % (ref 34–48)
HGB BLD-MCNC: 9 G/DL (ref 11.5–15.5)
IMM GRANULOCYTES # BLD: 0.03 E9/L
IMM GRANULOCYTES NFR BLD: 0.4 % (ref 0–5)
LYMPHOCYTES # BLD: 1.39 E9/L (ref 1.5–4)
LYMPHOCYTES NFR BLD: 20.6 % (ref 20–42)
MAGNESIUM SERPL-MCNC: 1.8 MG/DL (ref 1.6–2.6)
MCH RBC QN AUTO: 31.7 PG (ref 26–35)
MCHC RBC AUTO-ENTMCNC: 32.7 % (ref 32–34.5)
MCV RBC AUTO: 96.8 FL (ref 80–99.9)
MONOCYTES # BLD: 0.71 E9/L (ref 0.1–0.95)
MONOCYTES NFR BLD: 10.5 % (ref 2–12)
NEUTROPHILS # BLD: 4.46 E9/L (ref 1.8–7.3)
NEUTS SEG NFR BLD: 66.2 % (ref 43–80)
PLATELET # BLD AUTO: 200 E9/L (ref 130–450)
PMV BLD AUTO: 8.9 FL (ref 7–12)
POTASSIUM SERPL-SCNC: 3.1 MMOL/L (ref 3.5–5)
PROT SERPL-MCNC: 5.1 G/DL (ref 6.4–8.3)
RBC # BLD AUTO: 2.84 E12/L (ref 3.5–5.5)
SARS-COV-2 RDRP RESP QL NAA+PROBE: NOT DETECTED
SODIUM SERPL-SCNC: 141 MMOL/L (ref 132–146)
WBC # BLD: 6.8 E9/L (ref 4.5–11.5)

## 2023-05-01 PROCEDURE — 97530 THERAPEUTIC ACTIVITIES: CPT

## 2023-05-01 PROCEDURE — 80053 COMPREHEN METABOLIC PANEL: CPT

## 2023-05-01 PROCEDURE — 6370000000 HC RX 637 (ALT 250 FOR IP): Performed by: ORTHOPAEDIC SURGERY

## 2023-05-01 PROCEDURE — 6370000000 HC RX 637 (ALT 250 FOR IP): Performed by: INTERNAL MEDICINE

## 2023-05-01 PROCEDURE — 83735 ASSAY OF MAGNESIUM: CPT

## 2023-05-01 PROCEDURE — 6370000000 HC RX 637 (ALT 250 FOR IP)

## 2023-05-01 PROCEDURE — 87635 SARS-COV-2 COVID-19 AMP PRB: CPT

## 2023-05-01 PROCEDURE — 97110 THERAPEUTIC EXERCISES: CPT

## 2023-05-01 PROCEDURE — 85025 COMPLETE CBC W/AUTO DIFF WBC: CPT

## 2023-05-01 PROCEDURE — 36415 COLL VENOUS BLD VENIPUNCTURE: CPT

## 2023-05-01 RX ORDER — SERTRALINE HYDROCHLORIDE 25 MG/1
25 TABLET, FILM COATED ORAL DAILY
Qty: 30 TABLET | Refills: 3 | Status: SHIPPED | OUTPATIENT
Start: 2023-05-01

## 2023-05-01 RX ORDER — POTASSIUM CHLORIDE 20 MEQ/1
40 TABLET, EXTENDED RELEASE ORAL PRN
Status: DISCONTINUED | OUTPATIENT
Start: 2023-05-01 | End: 2023-05-01 | Stop reason: HOSPADM

## 2023-05-01 RX ORDER — MAGNESIUM SULFATE IN WATER 40 MG/ML
2000 INJECTION, SOLUTION INTRAVENOUS PRN
Status: DISCONTINUED | OUTPATIENT
Start: 2023-05-01 | End: 2023-05-01 | Stop reason: HOSPADM

## 2023-05-01 RX ORDER — LEVOTHYROXINE SODIUM 0.1 MG/1
100 TABLET ORAL DAILY
Qty: 30 TABLET | Refills: 3 | Status: SHIPPED | OUTPATIENT
Start: 2023-05-02

## 2023-05-01 RX ORDER — POTASSIUM CHLORIDE 7.45 MG/ML
10 INJECTION INTRAVENOUS PRN
Status: DISCONTINUED | OUTPATIENT
Start: 2023-05-01 | End: 2023-05-01 | Stop reason: HOSPADM

## 2023-05-01 RX ADMIN — POTASSIUM BICARBONATE 40 MEQ: 782 TABLET, EFFERVESCENT ORAL at 08:50

## 2023-05-01 RX ADMIN — SERTRALINE 25 MG: 50 TABLET, FILM COATED ORAL at 08:50

## 2023-05-01 RX ADMIN — LEVOTHYROXINE SODIUM 100 MCG: 100 TABLET ORAL at 06:19

## 2023-05-01 RX ADMIN — ASPIRIN 325 MG: 325 TABLET, COATED ORAL at 08:50

## 2023-05-01 NOTE — PROGRESS NOTES
Nurse to nurse called to [facility].
knee and preventing hip IR while standing  Sit to stand: Moderate assist of 1 Cues for hand placement and safety     Sit to stand: Minimal assist of 1    Ambulation    Attempted but pt with RLE knee buckling and unable to weight shift due to pain unsafe at this time. Not assessed    15 feet using  wheeled walker with Minimal assist of 1    Stair negotiation: ascended and descended   Not assessed         ROM Within functional limits    Increase range of motion 10% of affected joints    Strength BUE:  refer to OT eval  RLE:  NT  LLE:  3+/5  Increase strength in affected mm groups by 1/3 grade   Balance Sitting EOB:  fair , min A for left  posterior lean   Dynamic Standing:  poor , knee buckling on RIGHT, cueing for upright posture, leaning towards left to avoid pain  Sitting EOB: fair+   Dynamic Standing: fair with wheeled walker    Sitting EOB:  good   Dynamic Standing: fair +     Patient is Alert & Oriented x person and place and follows one step directions   Sensation:  Patient  denies numbness/tingling   Edema:  yes right lower extremity   Endurance: fair  -, fatigues with light activity     Vitals: room air   Blood Pressure at rest  Blood Pressure during session    Heart Rate at rest  Heart Rate during session    SPO2 at rest %  SPO2 during session %     Patient education  Patient educated on role of Physical Therapy, risks of immobility, safety and plan of care,  importance of mobility while in hospital , ankle pumps, quad set and glut set for edema control, blood clot prevention, importance and purpose of adaptive device and adjusted to proper height for the patient. , and safety      Patient response to education:   Pt verbalized understanding Pt demonstrated skill Pt requires further education in this area   Yes Partial Yes      Treatment:  Patient practiced and was instructed/facilitated in the following treatment: Patient performed supine exercises.  Pt assisted to edge of bed, Sat edge of bed 12 minutes

## 2023-05-01 NOTE — DISCHARGE INSTR - COC
Continuity of Care Form    Patient Name: Jean Claude Han   :  1940  MRN:  [id number]    Admit date:  2023  Discharge date:  2023    Code Status Order: [facility]   Advance Directives:   [address] [facility] Documentation       Date/Time Healthcare Directive Type of Healthcare Directive Copy in [address] Agent's Name Healthcare Agent's Phone Number    [phone] Yes, patient has an advance directive for healthcare treatment -- -- -- -- --            Admitting Physician:  Alex Obrien DO  PCP: Yamil Nieto MD    Discharging Nurse: [facility] Unit/Room#: 3539/6653-10  Discharging Unit Phone Number: [phone]    Emergency Contact:   Extended Emergency Contact Information  Primary Emergency Contact: [address] [place] [address] Phone: [phone]  Relation: Niece/Nephew   needed? No  Secondary Emergency Contact: yuliya childers  Work Phone: [phone]  Mobile Phone: [phone]  Relation: Niece/Nephew    Past Surgical History:  History reviewed. No pertinent surgical history.     Immunization History:   Immunization History   Administered Date(s) Administered    COVID-19, MODERNA BLUE border, Primary or Immunocompromised, (age 12y+), IM, 100 mcg/0.5mL 2021, 2021       Active Problems:  Patient Active Problem List   Diagnosis Code    Closed right hip fracture, initial encounter ([facility] [address] S72.001A    [facility] S[facility]       Isolation/Infection:   Isolation            No Isolation          Patient Infection Status       None to display            Nurse Assessment:  Last Vital Signs: BP (!) 180/71   Pulse 72   Temp 98.8 °F (37.1 °C) (Oral)   Resp 17   Ht 5' (1.524 m)   Wt 93 lb (42.2 kg)   SpO2 (!) 88%   BMI 18.16 kg/m²     Last documented pain score (0-10 scale): Pain Level: 7  Last Weight:   Wt Readings from Last 1 Encounters:   23 93 lb (42.2 kg)     Mental Status:  oriented and alert    IV Access:  -

## 2023-05-01 NOTE — PLAN OF CARE
Problem: Discharge Planning  Goal: Discharge to home or other facility with appropriate resources  Outcome: Progressing     Problem: Pain  Goal: Verbalizes/displays adequate comfort level or baseline comfort level  Outcome: Progressing     Problem: Skin/Tissue Integrity  Goal: Absence of new skin breakdown  Description: 1. Monitor for areas of redness and/or skin breakdown  2. Assess vascular access sites hourly  3. Every 4-6 hours minimum:  Change oxygen saturation probe site  4. Every 4-6 hours:  If on nasal continuous positive airway pressure, respiratory therapy assess nares and determine need for appliance change or resting period. Outcome: Progressing     Problem: ABCDS Injury Assessment  Goal: Absence of physical injury  Outcome: Progressing     Problem: Safety - Adult  Goal: Free from fall injury  Outcome: Progressing     Problem: Confusion  Goal: Confusion, delirium, dementia, or psychosis is improved or at baseline  Description: INTERVENTIONS:  1. Assess for possible contributors to thought disturbance, including medications, impaired vision or hearing, underlying metabolic abnormalities, dehydration, psychiatric diagnoses, and notify attending LIP  2. [place] high risk fall precautions, as indicated  3. Provide frequent short contacts to provide reality reorientation, refocusing and direction  4. Decrease environmental stimuli, including noise as appropriate  5. Monitor and intervene to maintain adequate nutrition, hydration, elimination, sleep and activity  6. If unable to ensure safety without constant attention obtain sitter and review sitter guidelines with assigned personnel  7.  Initiate Psychosocial CNS and Spiritual Care consult, as indicated  Outcome: Progressing     Problem: Musculoskeletal - Adult  Goal: Return mobility to safest level of function  Outcome: Progressing     Problem: Musculoskeletal - Adult  Goal: Maintain proper alignment of affected body part  Outcome: Progressing

## 2023-05-01 NOTE — CARE COORDINATION
5/1/23  Note/Discharge plan:  Met with pt's niece, Sukumar Valle at pt's bedside, SNF list reviewed, she now prefers placement at [facility] or [address] [place] [address] vs [facility], still no bed available at [facility], referral made to [facility] admissions liaison at [facility], awaiting chart review and acceptance for HETAL placement for admission today, pt has Medicare, NO PRE CERT NEEDED, sw to follow.  Electronically signed by CARROLL Brower on 5/1/2023 at 10:24 AM

## 2023-05-01 NOTE — DISCHARGE SUMMARY
Internal Medicine Progress Note     [facility]     Sugey Thornton. Cinthia Banerjee., F.A.C.OViviI. Carina Chawla D.O., BELL.CViviOVIVI Pedraza D.O. Maddy Martinez, MSN, APRN, NP-C  Xiang Najera. Collins Garcia, MSN, [address] [facility]       Internal Medicine  Discharge Summary    NAME: Jean Claude Han  :  1940  MRN:  [id number]  Ubaldo Medina MD  ADMITTED: 2023      DISCHARGED: 23    ADMITTING PHYSICIAN: Sugey Kimbrough DO    CONSULTANT(S):   IP CONSULT TO ORTHOPEDIC SURGERY  IP CONSULT TO SOCIAL WORK  IP CONSULT TO CASE MANAGEMENT  IP CONSULT TO SOCIAL WORK     ADMITTING DIAGNOSIS:   Fall, initial encounter [W19. XXXA]  Closed right hip fracture, initial encounter ([facility] [address] [S72.001A]  Closed intertrochanteric fracture of hip, right, initial encounter ([facility] [address] Keyon Castaneda     DISCHARGE DIAGNOSES:   Right hip fracture secondary mechanical fall s/p orthopedic repair  Acute blood loss as cause of postoperative anemia  Advanced Alzheimer dementia  Hypothyroidism-TSH elevated(9.14): increased Synthroid thyroid function will need to be rechecked in 6 weeks    BRIEF HISTORY OF PRESENT ILLNESS:   Patient is an 80-year-old female who presented to the ED due to right hip pain. Patient has a history of dementia and nephews wife is at bedside. According to her patient was walking outside down the steps outside the door when she fell to the ground. Patient required assistance to get back up. She denies passing out. Patient was able to ambulate back inside and took a seat. However after getting back up following dinner she fell to the ground as she cannot bear weight on her right leg. Currently pain is controlled. Denies any chest pain or shortness of breath. Denies any nausea or emesis. Denies any fever or chills.     LABS[de-identified]  Lab Results   Component Value Date    WBC 6.8 2023    HGB 9.0 (L) 2023    HCT 27.5 (L) 2023     2023     2023

## 2023-05-01 NOTE — CARE COORDINATION
5/1/23 SS Note/Discharge plan:  Notified by [facility] admissions for [facility] that they have accepted pt for HETAL admission on their Dementia unit and transfer confirmed for today at 12:30pm [facility], request a RAPID COVID TEST prior to transfer, nurisng informed, pt and pt's niece Jonathan Hester notified, Ita Schafer and SANDY completed.  Electronically signed by CARROLL Esquivel on 5/1/2023 at 11:04 AM

## 2023-05-05 LAB
ALBUMIN SERPL-MCNC: 3.7 G/DL (ref 3.5–5.2)
ALP SERPL-CCNC: 132 U/L (ref 35–104)
ALT SERPL-CCNC: 15 U/L (ref 0–32)
ANION GAP SERPL CALCULATED.3IONS-SCNC: 11 MMOL/L (ref 7–16)
AST SERPL-CCNC: 19 U/L (ref 0–31)
BASOPHILS # BLD: 0.07 E9/L (ref 0–0.2)
BASOPHILS NFR BLD: 0.8 % (ref 0–2)
BILIRUB SERPL-MCNC: 1 MG/DL (ref 0–1.2)
BUN SERPL-MCNC: 14 MG/DL (ref 6–23)
CALCIUM SERPL-MCNC: 10.3 MG/DL (ref 8.6–10.2)
CHLORIDE SERPL-SCNC: 101 MMOL/L (ref 98–107)
CO2 SERPL-SCNC: 28 MMOL/L (ref 22–29)
CREAT SERPL-MCNC: 0.4 MG/DL (ref 0.5–1)
EOSINOPHIL # BLD: 0.24 E9/L (ref 0.05–0.5)
EOSINOPHIL NFR BLD: 2.7 % (ref 0–6)
ERYTHROCYTE [DISTWIDTH] IN BLOOD BY AUTOMATED COUNT: 14.3 FL (ref 11.5–15)
GLUCOSE SERPL-MCNC: 91 MG/DL (ref 74–99)
HCT VFR BLD AUTO: 32 % (ref 34–48)
HGB BLD-MCNC: 10.3 G/DL (ref 11.5–15.5)
IMM GRANULOCYTES # BLD: 0.05 E9/L
IMM GRANULOCYTES NFR BLD: 0.6 % (ref 0–5)
LACTATE BLDV-SCNC: 1.5 MMOL/L (ref 0.5–2.2)
LYMPHOCYTES # BLD: 1.49 E9/L (ref 1.5–4)
LYMPHOCYTES NFR BLD: 16.6 % (ref 20–42)
MCH RBC QN AUTO: 31.8 PG (ref 26–35)
MCHC RBC AUTO-ENTMCNC: 32.2 % (ref 32–34.5)
MCV RBC AUTO: 98.8 FL (ref 80–99.9)
MONOCYTES # BLD: 0.94 E9/L (ref 0.1–0.95)
MONOCYTES NFR BLD: 10.4 % (ref 2–12)
NEUTROPHILS # BLD: 6.21 E9/L (ref 1.8–7.3)
NEUTS SEG NFR BLD: 68.9 % (ref 43–80)
PLATELET # BLD AUTO: 456 E9/L (ref 130–450)
PMV BLD AUTO: 8.7 FL (ref 7–12)
POTASSIUM SERPL-SCNC: 3.2 MMOL/L (ref 3.5–5)
PROT SERPL-MCNC: 6 G/DL (ref 6.4–8.3)
RBC # BLD AUTO: 3.24 E12/L (ref 3.5–5.5)
SODIUM SERPL-SCNC: 140 MMOL/L (ref 132–146)
WBC # BLD: 9 E9/L (ref 4.5–11.5)

## 2023-05-06 LAB
BACTERIA BLD CULT ORG #2: NORMAL
BACTERIA BLD CULT: NORMAL
BACTERIA URNS QL MICRO: ABNORMAL /HPF
BILIRUB UR QL STRIP: NEGATIVE
CLARITY UR: CLEAR
COLOR UR: YELLOW
CRYSTALS URNS MICRO: ABNORMAL /HPF
GLUCOSE UR STRIP-MCNC: NEGATIVE MG/DL
HGB UR QL STRIP: ABNORMAL
KETONES UR STRIP-MCNC: NEGATIVE MG/DL
LEUKOCYTE ESTERASE UR QL STRIP: NEGATIVE
NITRITE UR QL STRIP: NEGATIVE
PH UR STRIP: 7 [PH] (ref 5–9)
PROT UR STRIP-MCNC: ABNORMAL MG/DL
RBC #/AREA URNS HPF: ABNORMAL /HPF (ref 0–2)
SP GR UR STRIP: 1.01 (ref 1–1.03)
UROBILINOGEN UR STRIP-ACNC: 1 E.U./DL
WBC #/AREA URNS HPF: ABNORMAL /HPF (ref 0–5)

## 2023-05-07 LAB — BACTERIA UR CULT: NORMAL

## 2023-05-08 LAB — BACTERIA UR CULT: NORMAL

## 2023-05-10 LAB
BACTERIA BLD CULT ORG #2: NORMAL
BACTERIA BLD CULT: NORMAL
BNP BLD-MCNC: 295 PG/ML (ref 0–450)

## 2023-05-11 DIAGNOSIS — S72.001A CLOSED RIGHT HIP FRACTURE, INITIAL ENCOUNTER (HCC): Primary | ICD-10-CM

## 2023-05-15 ENCOUNTER — OFFICE VISIT (OUTPATIENT)
Dept: ORTHOPEDIC SURGERY | Age: 83
End: 2023-05-15

## 2023-05-15 VITALS — HEIGHT: 60 IN | BODY MASS INDEX: 18.26 KG/M2 | TEMPERATURE: 98 F | WEIGHT: 93 LBS

## 2023-05-15 DIAGNOSIS — S72.001A CLOSED RIGHT HIP FRACTURE, INITIAL ENCOUNTER (HCC): Primary | ICD-10-CM

## 2023-05-15 PROCEDURE — 99024 POSTOP FOLLOW-UP VISIT: CPT | Performed by: ORTHOPAEDIC SURGERY

## 2023-05-15 NOTE — PROGRESS NOTES
Hadley Arredondo returns today for follow-up of a right intertroch fracture. Date of injury/surgery was 4/28. She reports minimal pain. Pain level is a 1/10. Physical Exam:  rle - Skin intact with no deformity          is not tender to palpation at the area of the fracture site. ROM   limited          The skin overlying th is intact without evidence of scar, lesion, laceration or               abrasion. Pulses are intact and symmetric bilaterally         Strength intact         Sensation intact     Xrays:   IMN intertroch fracture  Radiographic findings reviewed with patient    Impression:   Encounter Diagnosis   Name Primary?     Closed right hip fracture, initial encounter ([facility] [address] Yes       Plan:     WBAT  Continue PT  Fu 4 wks mathieu with xray  Follow up 4 weeks

## 2023-06-08 DIAGNOSIS — S72.001A CLOSED RIGHT HIP FRACTURE, INITIAL ENCOUNTER (HCC): Primary | ICD-10-CM

## 2023-08-09 DIAGNOSIS — S72.001A CLOSED RIGHT HIP FRACTURE, INITIAL ENCOUNTER (HCC): Primary | ICD-10-CM

## 2023-08-17 ENCOUNTER — HOSPITAL ENCOUNTER (INPATIENT)
Age: 83
LOS: 4 days | Discharge: NURSING, REHAB OR LONG-TERM CARE | DRG: 522 | End: 2023-08-21
Attending: STUDENT IN AN ORGANIZED HEALTH CARE EDUCATION/TRAINING PROGRAM | Admitting: INTERNAL MEDICINE
Payer: MEDICARE

## 2023-08-17 ENCOUNTER — APPOINTMENT (OUTPATIENT)
Dept: GENERAL RADIOLOGY | Age: 83
DRG: 522 | End: 2023-08-17
Payer: MEDICARE

## 2023-08-17 ENCOUNTER — APPOINTMENT (OUTPATIENT)
Dept: CT IMAGING | Age: 83
DRG: 522 | End: 2023-08-17
Payer: MEDICARE

## 2023-08-17 DIAGNOSIS — S72.001A CLOSED RIGHT HIP FRACTURE, INITIAL ENCOUNTER (HCC): ICD-10-CM

## 2023-08-17 DIAGNOSIS — S72.002A CLOSED LEFT HIP FRACTURE, INITIAL ENCOUNTER (HCC): ICD-10-CM

## 2023-08-17 DIAGNOSIS — S72.002A CLOSED FRACTURE OF LEFT HIP, INITIAL ENCOUNTER (HCC): Primary | ICD-10-CM

## 2023-08-17 LAB
ABO + RH BLD: NORMAL
ALBUMIN SERPL-MCNC: 2.7 G/DL (ref 3.5–5.2)
ALP SERPL-CCNC: 202 U/L (ref 35–104)
ALT SERPL-CCNC: 15 U/L (ref 0–32)
ANION GAP SERPL CALCULATED.3IONS-SCNC: 7 MMOL/L (ref 7–16)
ARM BAND NUMBER: NORMAL
AST SERPL-CCNC: 27 U/L (ref 0–31)
BASOPHILS # BLD: 0.06 K/UL (ref 0–0.2)
BASOPHILS NFR BLD: 1 % (ref 0–2)
BILIRUB SERPL-MCNC: 0.4 MG/DL (ref 0–1.2)
BLOOD BANK SAMPLE EXPIRATION: NORMAL
BLOOD GROUP ANTIBODIES SERPL: NEGATIVE
BUN SERPL-MCNC: 17 MG/DL (ref 6–23)
CALCIUM SERPL-MCNC: 9.9 MG/DL (ref 8.6–10.2)
CHLORIDE SERPL-SCNC: 102 MMOL/L (ref 98–107)
CO2 SERPL-SCNC: 29 MMOL/L (ref 22–29)
CREAT SERPL-MCNC: 0.5 MG/DL (ref 0.5–1)
EOSINOPHIL # BLD: 0.06 K/UL (ref 0.05–0.5)
EOSINOPHILS RELATIVE PERCENT: 1 % (ref 0–6)
ERYTHROCYTE [DISTWIDTH] IN BLOOD BY AUTOMATED COUNT: 14 % (ref 11.5–15)
GFR SERPL CREATININE-BSD FRML MDRD: >60 ML/MIN/1.73M2
GLUCOSE SERPL-MCNC: 87 MG/DL (ref 74–99)
HCT VFR BLD AUTO: 42.4 % (ref 34–48)
HGB BLD-MCNC: 14.1 G/DL (ref 11.5–15.5)
IMM GRANULOCYTES # BLD AUTO: 0.03 K/UL (ref 0–0.58)
IMM GRANULOCYTES NFR BLD: 0 % (ref 0–5)
INR PPP: 0.9
LYMPHOCYTES NFR BLD: 1.35 K/UL (ref 1.5–4)
LYMPHOCYTES RELATIVE PERCENT: 17 % (ref 20–42)
MCH RBC QN AUTO: 30.9 PG (ref 26–35)
MCHC RBC AUTO-ENTMCNC: 33.3 G/DL (ref 32–34.5)
MCV RBC AUTO: 92.8 FL (ref 80–99.9)
MONOCYTES NFR BLD: 0.85 K/UL (ref 0.1–0.95)
MONOCYTES NFR BLD: 11 % (ref 2–12)
NEUTROPHILS NFR BLD: 70 % (ref 43–80)
NEUTS SEG NFR BLD: 5.46 K/UL (ref 1.8–7.3)
PARTIAL THROMBOPLASTIN TIME: 29.3 SEC (ref 24.5–35.1)
PARTIAL THROMBOPLASTIN TIME: 30.6 SEC (ref 24.5–35.1)
PLATELET # BLD AUTO: 248 K/UL (ref 130–450)
PMV BLD AUTO: 8.3 FL (ref 7–12)
POTASSIUM SERPL-SCNC: 4.4 MMOL/L (ref 3.5–5)
PROT SERPL-MCNC: 5.6 G/DL (ref 6.4–8.3)
PROTHROMBIN TIME: 10.3 SEC (ref 9.3–12.4)
RBC # BLD AUTO: 4.57 M/UL (ref 3.5–5.5)
REASON FOR REJECTION: NORMAL
SODIUM SERPL-SCNC: 138 MMOL/L (ref 132–146)
SPECIMEN SOURCE: NORMAL
WBC OTHER # BLD: 7.8 K/UL (ref 4.5–11.5)
ZZ NTE CLEAN UP: ORDERED TEST: NORMAL

## 2023-08-17 PROCEDURE — 86901 BLOOD TYPING SEROLOGIC RH(D): CPT

## 2023-08-17 PROCEDURE — 71045 X-RAY EXAM CHEST 1 VIEW: CPT

## 2023-08-17 PROCEDURE — 2580000003 HC RX 258: Performed by: INTERNAL MEDICINE

## 2023-08-17 PROCEDURE — 86850 RBC ANTIBODY SCREEN: CPT

## 2023-08-17 PROCEDURE — 86900 BLOOD TYPING SEROLOGIC ABO: CPT

## 2023-08-17 PROCEDURE — 72125 CT NECK SPINE W/O DYE: CPT

## 2023-08-17 PROCEDURE — 6360000002 HC RX W HCPCS: Performed by: STUDENT IN AN ORGANIZED HEALTH CARE EDUCATION/TRAINING PROGRAM

## 2023-08-17 PROCEDURE — 1200000000 HC SEMI PRIVATE

## 2023-08-17 PROCEDURE — 99285 EMERGENCY DEPT VISIT HI MDM: CPT

## 2023-08-17 PROCEDURE — 70450 CT HEAD/BRAIN W/O DYE: CPT

## 2023-08-17 PROCEDURE — 85730 THROMBOPLASTIN TIME PARTIAL: CPT

## 2023-08-17 PROCEDURE — 85610 PROTHROMBIN TIME: CPT

## 2023-08-17 PROCEDURE — 93005 ELECTROCARDIOGRAM TRACING: CPT | Performed by: STUDENT IN AN ORGANIZED HEALTH CARE EDUCATION/TRAINING PROGRAM

## 2023-08-17 PROCEDURE — 73502 X-RAY EXAM HIP UNI 2-3 VIEWS: CPT

## 2023-08-17 PROCEDURE — 85025 COMPLETE CBC W/AUTO DIFF WBC: CPT

## 2023-08-17 PROCEDURE — 6370000000 HC RX 637 (ALT 250 FOR IP): Performed by: INTERNAL MEDICINE

## 2023-08-17 PROCEDURE — 36415 COLL VENOUS BLD VENIPUNCTURE: CPT

## 2023-08-17 PROCEDURE — 80053 COMPREHEN METABOLIC PANEL: CPT

## 2023-08-17 RX ORDER — SODIUM CHLORIDE 9 MG/ML
INJECTION, SOLUTION INTRAVENOUS PRN
Status: DISCONTINUED | OUTPATIENT
Start: 2023-08-17 | End: 2023-08-21 | Stop reason: HOSPADM

## 2023-08-17 RX ORDER — ACETAMINOPHEN 325 MG/1
650 TABLET ORAL EVERY 6 HOURS PRN
Status: DISCONTINUED | OUTPATIENT
Start: 2023-08-17 | End: 2023-08-21 | Stop reason: HOSPADM

## 2023-08-17 RX ORDER — FUROSEMIDE 20 MG/1
20 TABLET ORAL DAILY
Status: DISCONTINUED | OUTPATIENT
Start: 2023-08-17 | End: 2023-08-21 | Stop reason: HOSPADM

## 2023-08-17 RX ORDER — LANOLIN ALCOHOL/MO/W.PET/CERES
2.5 CREAM (GRAM) TOPICAL NIGHTLY PRN
COMMUNITY

## 2023-08-17 RX ORDER — SODIUM CHLORIDE 0.9 % (FLUSH) 0.9 %
5-40 SYRINGE (ML) INJECTION PRN
Status: DISCONTINUED | OUTPATIENT
Start: 2023-08-17 | End: 2023-08-21 | Stop reason: HOSPADM

## 2023-08-17 RX ORDER — ONDANSETRON 2 MG/ML
4 INJECTION INTRAMUSCULAR; INTRAVENOUS EVERY 6 HOURS PRN
Status: DISCONTINUED | OUTPATIENT
Start: 2023-08-17 | End: 2023-08-19 | Stop reason: SDUPTHER

## 2023-08-17 RX ORDER — ACETAMINOPHEN 650 MG/1
650 SUPPOSITORY RECTAL EVERY 4 HOURS PRN
COMMUNITY

## 2023-08-17 RX ORDER — OXYCODONE HYDROCHLORIDE AND ACETAMINOPHEN 5; 325 MG/1; MG/1
1 TABLET ORAL EVERY 8 HOURS PRN
Status: DISCONTINUED | OUTPATIENT
Start: 2023-08-17 | End: 2023-08-20

## 2023-08-17 RX ORDER — LEVOTHYROXINE SODIUM 0.1 MG/1
100 TABLET ORAL DAILY
Status: DISCONTINUED | OUTPATIENT
Start: 2023-08-17 | End: 2023-08-18

## 2023-08-17 RX ORDER — PROMETHAZINE HYDROCHLORIDE 25 MG/1
25 TABLET ORAL EVERY 4 HOURS PRN
COMMUNITY

## 2023-08-17 RX ORDER — MECOBALAMIN 5000 MCG
2.5 TABLET,DISINTEGRATING ORAL NIGHTLY PRN
Status: DISCONTINUED | OUTPATIENT
Start: 2023-08-17 | End: 2023-08-21 | Stop reason: HOSPADM

## 2023-08-17 RX ORDER — MORPHINE SULFATE 2 MG/ML
1 INJECTION, SOLUTION INTRAMUSCULAR; INTRAVENOUS ONCE
Status: COMPLETED | OUTPATIENT
Start: 2023-08-17 | End: 2023-08-17

## 2023-08-17 RX ORDER — ACETAMINOPHEN 650 MG/1
650 SUPPOSITORY RECTAL EVERY 6 HOURS PRN
Status: DISCONTINUED | OUTPATIENT
Start: 2023-08-17 | End: 2023-08-21 | Stop reason: HOSPADM

## 2023-08-17 RX ORDER — LORAZEPAM 0.5 MG/1
0.5 TABLET ORAL EVERY 4 HOURS PRN
Status: ON HOLD | COMMUNITY
End: 2023-08-21 | Stop reason: SDUPTHER

## 2023-08-17 RX ORDER — ONDANSETRON 4 MG/1
4 TABLET, ORALLY DISINTEGRATING ORAL EVERY 8 HOURS PRN
Status: DISCONTINUED | OUTPATIENT
Start: 2023-08-17 | End: 2023-08-19 | Stop reason: SDUPTHER

## 2023-08-17 RX ORDER — ACETAMINOPHEN 325 MG/1
650 TABLET ORAL EVERY 4 HOURS PRN
COMMUNITY

## 2023-08-17 RX ORDER — DEXTROSE MONOHYDRATE 100 MG/ML
INJECTION, SOLUTION INTRAVENOUS CONTINUOUS PRN
Status: DISCONTINUED | OUTPATIENT
Start: 2023-08-17 | End: 2023-08-21 | Stop reason: HOSPADM

## 2023-08-17 RX ORDER — POLYETHYLENE GLYCOL 3350 17 G/17G
17 POWDER, FOR SOLUTION ORAL DAILY PRN
Status: DISCONTINUED | OUTPATIENT
Start: 2023-08-17 | End: 2023-08-21 | Stop reason: HOSPADM

## 2023-08-17 RX ORDER — SODIUM CHLORIDE 0.9 % (FLUSH) 0.9 %
5-40 SYRINGE (ML) INJECTION EVERY 12 HOURS SCHEDULED
Status: DISCONTINUED | OUTPATIENT
Start: 2023-08-17 | End: 2023-08-21 | Stop reason: HOSPADM

## 2023-08-17 RX ORDER — FUROSEMIDE 20 MG/1
20 TABLET ORAL DAILY
COMMUNITY

## 2023-08-17 RX ORDER — LORAZEPAM 0.5 MG/1
0.5 TABLET ORAL EVERY 4 HOURS PRN
Status: DISCONTINUED | OUTPATIENT
Start: 2023-08-17 | End: 2023-08-21 | Stop reason: HOSPADM

## 2023-08-17 RX ADMIN — MORPHINE SULFATE 1 MG: 2 INJECTION, SOLUTION INTRAMUSCULAR; INTRAVENOUS at 18:17

## 2023-08-17 RX ADMIN — SODIUM CHLORIDE, PRESERVATIVE FREE 10 ML: 5 INJECTION INTRAVENOUS at 21:48

## 2023-08-17 RX ADMIN — OXYCODONE AND ACETAMINOPHEN 1 TABLET: 325; 5 TABLET ORAL at 22:45

## 2023-08-17 ASSESSMENT — LIFESTYLE VARIABLES
HOW MANY STANDARD DRINKS CONTAINING ALCOHOL DO YOU HAVE ON A TYPICAL DAY: PATIENT DOES NOT DRINK
HOW OFTEN DO YOU HAVE A DRINK CONTAINING ALCOHOL: NEVER

## 2023-08-17 ASSESSMENT — PAIN DESCRIPTION - DESCRIPTORS: DESCRIPTORS: SORE;THROBBING

## 2023-08-17 ASSESSMENT — PAIN DESCRIPTION - LOCATION: LOCATION: HIP

## 2023-08-17 ASSESSMENT — PAIN SCALES - GENERAL
PAINLEVEL_OUTOF10: 0
PAINLEVEL_OUTOF10: 10

## 2023-08-17 ASSESSMENT — PAIN DESCRIPTION - ORIENTATION: ORIENTATION: LEFT

## 2023-08-17 NOTE — ED PROVIDER NOTES
Findings discussed with patient as well as niece at the bedside. Patient be admitted to the hospital for orthopedic surgery evaluation. Case discussed with Ramírez Ordonez who agreed to admit patient. Patient be admitted to the hospital stable condition. Disposition Considerations (Tests not ordered but considered, Shared Decision Making, Pt Expectation of Test or Tx.): Shared decision making discussed with patient as well as niece at the bedside. Did discuss x-ray findings concerning for femoral neck fracture. They have agreed to surgery patient be admitted to the hospital for orthopedic evaluation. FINAL IMPRESSION      1. Closed fracture of left hip, initial encounter [facility])          DISPOSITION/PLAN     DISPOSITION Admitted 08/17/2023 06:16:03 PM    PATIENT REFERRED TO:  No follow-up provider specified.     DISCHARGE MEDICATIONS:  New Prescriptions    No medications on file       DISCONTINUED MEDICATIONS:  Discontinued Medications    No medications on file            (Please note that portions of this note were completed with a voice recognition program.  Efforts were made to edit the dictations but occasionally words are mis-transcribed.)    Geri Soni DO (electronically signed)       Geri Soni DO  08/17/23 [id number]

## 2023-08-17 NOTE — H&P
32.5 08/18/2023 04:36 AM    RDW 13.9 08/18/2023 04:36 AM    LYMPHOPCT 24 08/18/2023 04:36 AM    MONOPCT 11 08/18/2023 04:36 AM    BASOPCT 1 08/18/2023 04:36 AM    MONOSABS 0.75 08/18/2023 04:36 AM    LYMPHSABS 1.63 08/18/2023 04:36 AM    EOSABS 0.09 08/18/2023 04:36 AM    BASOSABS 0.06 08/18/2023 04:36 AM     CMP:    Lab Results   Component Value Date/Time     08/17/2023 05:17 PM    K 4.4 08/17/2023 05:17 PM    K 3.8 04/30/2023 03:16 AM     08/17/2023 05:17 PM    CO2 29 08/17/2023 05:17 PM    BUN 17 08/17/2023 05:17 PM    CREATININE 0.5 08/17/2023 05:17 PM    LABGLOM >60 08/17/2023 05:17 PM    GLUCOSE 87 08/17/2023 05:17 PM    PROT 5.6 08/17/2023 05:17 PM    LABALBU 2.7 08/17/2023 05:17 PM    CALCIUM 9.9 08/17/2023 05:17 PM    BILITOT 0.4 08/17/2023 05:17 PM    ALKPHOS 202 08/17/2023 05:17 PM    AST 27 08/17/2023 05:17 PM    ALT 15 08/17/2023 05:17 PM       ASSESSMENT/PLAN:  Left hip fracture  Alzheimer's dementia      Patient presented with left-sided hip fracture following a fall about a week ago. Within lower quarter. Home medication resumed as appropriate. Pain medication ordered.   Orthopedic surgery consulted with plans for orthopedic intervention tomorrow        Kristie Grant DO  7:35 PM  8/17/2023    Electronically signed by Kristie Grant DO on 8/17/23 at 7:35 PM EDT

## 2023-08-18 ENCOUNTER — APPOINTMENT (OUTPATIENT)
Dept: GENERAL RADIOLOGY | Age: 83
DRG: 522 | End: 2023-08-18
Payer: MEDICARE

## 2023-08-18 ENCOUNTER — ANESTHESIA EVENT (OUTPATIENT)
Dept: OPERATING ROOM | Age: 83
End: 2023-08-18
Payer: MEDICARE

## 2023-08-18 ENCOUNTER — ANESTHESIA (OUTPATIENT)
Dept: OPERATING ROOM | Age: 83
End: 2023-08-18
Payer: MEDICARE

## 2023-08-18 LAB
25(OH)D3 SERPL-MCNC: 15.7 NG/ML (ref 30–100)
ALBUMIN SERPL-MCNC: 2.8 G/DL (ref 3.5–5.2)
ALP SERPL-CCNC: 204 U/L (ref 35–104)
ALT SERPL-CCNC: 13 U/L (ref 0–32)
ANION GAP SERPL CALCULATED.3IONS-SCNC: 7 MMOL/L (ref 7–16)
AST SERPL-CCNC: 16 U/L (ref 0–31)
BASOPHILS # BLD: 0.06 K/UL (ref 0–0.2)
BASOPHILS NFR BLD: 1 % (ref 0–2)
BILIRUB SERPL-MCNC: 0.3 MG/DL (ref 0–1.2)
BUN SERPL-MCNC: 19 MG/DL (ref 6–23)
CALCIUM SERPL-MCNC: 9.6 MG/DL (ref 8.6–10.2)
CHLORIDE SERPL-SCNC: 104 MMOL/L (ref 98–107)
CO2 SERPL-SCNC: 30 MMOL/L (ref 22–29)
CREAT SERPL-MCNC: 0.4 MG/DL (ref 0.5–1)
EKG ATRIAL RATE: 77 BPM
EKG P AXIS: 85 DEGREES
EKG P-R INTERVAL: 164 MS
EKG Q-T INTERVAL: 392 MS
EKG QRS DURATION: 82 MS
EKG QTC CALCULATION (BAZETT): 443 MS
EKG R AXIS: 55 DEGREES
EKG T AXIS: 79 DEGREES
EKG VENTRICULAR RATE: 77 BPM
EOSINOPHIL # BLD: 0.09 K/UL (ref 0.05–0.5)
EOSINOPHILS RELATIVE PERCENT: 1 % (ref 0–6)
ERYTHROCYTE [DISTWIDTH] IN BLOOD BY AUTOMATED COUNT: 13.9 % (ref 11.5–15)
GFR SERPL CREATININE-BSD FRML MDRD: >60 ML/MIN/1.73M2
GLUCOSE BLD-MCNC: 124 MG/DL (ref 74–99)
GLUCOSE SERPL-MCNC: 83 MG/DL (ref 74–99)
HCT VFR BLD AUTO: 41.8 % (ref 34–48)
HGB BLD-MCNC: 13.6 G/DL (ref 11.5–15.5)
IMM GRANULOCYTES # BLD AUTO: 0.03 K/UL (ref 0–0.58)
IMM GRANULOCYTES NFR BLD: 0 % (ref 0–5)
LYMPHOCYTES NFR BLD: 1.63 K/UL (ref 1.5–4)
LYMPHOCYTES RELATIVE PERCENT: 24 % (ref 20–42)
MAGNESIUM SERPL-MCNC: 2.1 MG/DL (ref 1.6–2.6)
MCH RBC QN AUTO: 30.6 PG (ref 26–35)
MCHC RBC AUTO-ENTMCNC: 32.5 G/DL (ref 32–34.5)
MCV RBC AUTO: 94.1 FL (ref 80–99.9)
MONOCYTES NFR BLD: 0.75 K/UL (ref 0.1–0.95)
MONOCYTES NFR BLD: 11 % (ref 2–12)
NEUTROPHILS NFR BLD: 63 % (ref 43–80)
NEUTS SEG NFR BLD: 4.38 K/UL (ref 1.8–7.3)
PHOSPHATE SERPL-MCNC: 2.7 MG/DL (ref 2.5–4.5)
PLATELET # BLD AUTO: 278 K/UL (ref 130–450)
PMV BLD AUTO: 8.5 FL (ref 7–12)
POTASSIUM SERPL-SCNC: 3.6 MMOL/L (ref 3.5–5)
PROCALCITONIN SERPL-MCNC: 0.07 NG/ML (ref 0–0.08)
PROT SERPL-MCNC: 5.3 G/DL (ref 6.4–8.3)
RBC # BLD AUTO: 4.44 M/UL (ref 3.5–5.5)
SODIUM SERPL-SCNC: 141 MMOL/L (ref 132–146)
T4 FREE SERPL-MCNC: 1.1 NG/DL (ref 0.9–1.7)
TROPONIN I SERPL HS-MCNC: 20 NG/L (ref 0–9)
TSH SERPL DL<=0.05 MIU/L-ACNC: 25.36 UIU/ML (ref 0.76–16.11)
WBC OTHER # BLD: 6.9 K/UL (ref 4.5–11.5)

## 2023-08-18 PROCEDURE — 6360000002 HC RX W HCPCS

## 2023-08-18 PROCEDURE — 6370000000 HC RX 637 (ALT 250 FOR IP)

## 2023-08-18 PROCEDURE — 83735 ASSAY OF MAGNESIUM: CPT

## 2023-08-18 PROCEDURE — 84100 ASSAY OF PHOSPHORUS: CPT

## 2023-08-18 PROCEDURE — 6370000000 HC RX 637 (ALT 250 FOR IP): Performed by: INTERNAL MEDICINE

## 2023-08-18 PROCEDURE — 1200000000 HC SEMI PRIVATE

## 2023-08-18 PROCEDURE — 84145 PROCALCITONIN (PCT): CPT

## 2023-08-18 PROCEDURE — 2580000003 HC RX 258

## 2023-08-18 PROCEDURE — 93010 ELECTROCARDIOGRAM REPORT: CPT | Performed by: INTERNAL MEDICINE

## 2023-08-18 PROCEDURE — 71045 X-RAY EXAM CHEST 1 VIEW: CPT

## 2023-08-18 PROCEDURE — 2580000003 HC RX 258: Performed by: INTERNAL MEDICINE

## 2023-08-18 PROCEDURE — 80053 COMPREHEN METABOLIC PANEL: CPT

## 2023-08-18 PROCEDURE — 73552 X-RAY EXAM OF FEMUR 2/>: CPT

## 2023-08-18 PROCEDURE — 84439 ASSAY OF FREE THYROXINE: CPT

## 2023-08-18 PROCEDURE — 82962 GLUCOSE BLOOD TEST: CPT

## 2023-08-18 PROCEDURE — 82306 VITAMIN D 25 HYDROXY: CPT

## 2023-08-18 PROCEDURE — 85025 COMPLETE CBC W/AUTO DIFF WBC: CPT

## 2023-08-18 PROCEDURE — 84443 ASSAY THYROID STIM HORMONE: CPT

## 2023-08-18 PROCEDURE — 84484 ASSAY OF TROPONIN QUANT: CPT

## 2023-08-18 PROCEDURE — 36415 COLL VENOUS BLD VENIPUNCTURE: CPT

## 2023-08-18 RX ORDER — SODIUM CHLORIDE 9 MG/ML
INJECTION, SOLUTION INTRAVENOUS CONTINUOUS
Status: DISCONTINUED | OUTPATIENT
Start: 2023-08-18 | End: 2023-08-21 | Stop reason: HOSPADM

## 2023-08-18 RX ORDER — FENTANYL CITRATE 0.05 MG/ML
25 INJECTION, SOLUTION INTRAMUSCULAR; INTRAVENOUS EVERY 4 HOURS PRN
Status: DISCONTINUED | OUTPATIENT
Start: 2023-08-18 | End: 2023-08-20

## 2023-08-18 RX ADMIN — FENTANYL CITRATE 25 MCG: 0.05 INJECTION, SOLUTION INTRAMUSCULAR; INTRAVENOUS at 17:11

## 2023-08-18 RX ADMIN — SODIUM CHLORIDE, PRESERVATIVE FREE 10 ML: 5 INJECTION INTRAVENOUS at 07:58

## 2023-08-18 RX ADMIN — FENTANYL CITRATE 25 MCG: 0.05 INJECTION, SOLUTION INTRAMUSCULAR; INTRAVENOUS at 23:22

## 2023-08-18 RX ADMIN — LEVOTHYROXINE SODIUM 137 MCG: 25 TABLET ORAL at 08:02

## 2023-08-18 RX ADMIN — OXYCODONE AND ACETAMINOPHEN 1 TABLET: 325; 5 TABLET ORAL at 13:02

## 2023-08-18 RX ADMIN — SODIUM CHLORIDE: 9 INJECTION, SOLUTION INTRAVENOUS at 07:56

## 2023-08-18 RX ADMIN — SERTRALINE 25 MG: 50 TABLET, FILM COATED ORAL at 08:02

## 2023-08-18 ASSESSMENT — PAIN SCALES - GENERAL
PAINLEVEL_OUTOF10: 8
PAINLEVEL_OUTOF10: 3
PAINLEVEL_OUTOF10: 8
PAINLEVEL_OUTOF10: 7

## 2023-08-18 ASSESSMENT — PAIN DESCRIPTION - DESCRIPTORS
DESCRIPTORS: SHOOTING
DESCRIPTORS: SORE
DESCRIPTORS: CRAMPING

## 2023-08-18 ASSESSMENT — PAIN DESCRIPTION - ORIENTATION
ORIENTATION: LEFT

## 2023-08-18 ASSESSMENT — PAIN DESCRIPTION - LOCATION
LOCATION: HIP

## 2023-08-18 NOTE — CONSULTS
08/17/2023 05:17 PM    MCH 30.9 08/17/2023 05:17 PM    MCHC 33.3 08/17/2023 05:17 PM    RDW 14.0 08/17/2023 05:17 PM     08/17/2023 05:17 PM    MPV 8.3 08/17/2023 05:17 PM     PT/INR:    Lab Results   Component Value Date/Time    PROTIME 10.3 08/17/2023 05:17 PM    INR 0.9 08/17/2023 05:17 PM       Radiology Review:  X-ray AP pelvis and left hip demonstrate a acute subcapital femoral neck fracture of the left side, varus angulation and impaction noted. Patient also status post right cephalomedullary nail, hardware appears to remain intact. No pubic symphysis diastases noted, patient does have notable old left inferior pubic rami fracture. No other obvious acute fracture dislocation noted. 1 view chest x-ray demonstrate what appear to be chronic changes degenerative in nature of the left shoulder. IMPRESSION:  Left femoral neck fracture    PLAN:  Nonweightbearing left lower extremity  Modal pain management  Please hold DVT prophylaxis  N.p.o. at midnight  PT/OT  X-ray to be obtained of left femur  Plan for left hemiarthroplasty on 8-18. Orthopedic surgery will continue to follow  I was present with the resident during the history and exam. I discussed the case with the resident and agree with the findings and plan as documented in the resident's note.          Will discuss with attending     Jelani Diamond,   PGY-2 Orthopedic Surgery

## 2023-08-18 NOTE — CARE COORDINATION
Case Management Assessment  Initial Evaluation    Date/Time of Evaluation: 8/18/2023 11:07 AM  Assessment Completed by: Rose Apple    If patient is discharged prior to next notation, then this note serves as note for discharge by case management. Patient Name: Leydi Valentine                   YOB: 1940  Diagnosis: Closed fracture of left hip, initial encounter [facility]) Chance Dexter  Closed left hip fracture, initial encounter ([facility]) Chance Dexter                   Date / Time: 8/17/2023  4:07 PM    Patient Admission Status: Inpatient   Readmission Risk (Low < 19, Mod (19-27), High > 27): Readmission Risk Score: 14.3    Current PCP: Katie Nguyen MD  PCP verified by CM? Yes    Chart Reviewed: Yes      History Provided by: Other (see comment) Loan Rios pt's niece and Legal Guardian)  Patient Orientation: Self    Patient Cognition: Dementia / Early Alzheimer's    Hospitalization in the last 30 days (Readmission):  No    If yes, Readmission Assessment in  Navigator will be completed. Advance Directives:      Code Status: Full Code   Patient's Primary Decision Maker is: Legal Next of Kin Loan Rios pt's niece and Legal Guardian)    Primary Decision Maker: Jennifer Guy - Niece/Nephew, [address]    Secondary Decision Maker: Park Lozano - [phone]    Discharge Planning:    Patient lives with: Other (Comment) (nursing home) Type of Home: Other (Comment) (memory care)  Primary Care Giver: Other (Comment) (Beto)  Patient Support Systems include:  Other (Comment) Loan Rios pt's niece and Legal Guardian)   Current Financial resources:    Current community resources:    Current services prior to admission: None            Current DME:              Type of Home Care services:  Nursing Services    ADLS  Prior functional level: Assistance with the following:, Bathing, Dressing, Toileting, Feeding, Cooking, Housework, Shopping, Mobility  Current functional

## 2023-08-18 NOTE — ACP (ADVANCE CARE PLANNING)
Advance Care Planning   Healthcare Decision Maker:    Primary Decision Maker: Justice Cruz, Legal Guardian - [phone]    Secondary Decision Maker: Von Jarvis - [phone]    Click here to complete Healthcare Decision Makers including selection of the Healthcare Decision Maker Relationship (ie \"Primary\"). Pt's  niece Opal Cornelius is pt's Legal Guardian- she will bring in the documents - however she is listed as such on the [facility] site.

## 2023-08-19 ENCOUNTER — APPOINTMENT (OUTPATIENT)
Dept: GENERAL RADIOLOGY | Age: 83
DRG: 522 | End: 2023-08-19
Payer: MEDICARE

## 2023-08-19 LAB
ALBUMIN SERPL-MCNC: 2.5 G/DL (ref 3.5–5.2)
ALP SERPL-CCNC: 174 U/L (ref 35–104)
ALT SERPL-CCNC: 10 U/L (ref 0–32)
ANION GAP SERPL CALCULATED.3IONS-SCNC: 6 MMOL/L (ref 7–16)
AST SERPL-CCNC: 13 U/L (ref 0–31)
BASOPHILS # BLD: 0.05 K/UL (ref 0–0.2)
BASOPHILS NFR BLD: 1 % (ref 0–2)
BILIRUB SERPL-MCNC: 0.3 MG/DL (ref 0–1.2)
BUN SERPL-MCNC: 12 MG/DL (ref 6–23)
CALCIUM SERPL-MCNC: 9.5 MG/DL (ref 8.6–10.2)
CHLORIDE SERPL-SCNC: 110 MMOL/L (ref 98–107)
CO2 SERPL-SCNC: 27 MMOL/L (ref 22–29)
CREAT SERPL-MCNC: 0.4 MG/DL (ref 0.5–1)
EOSINOPHIL # BLD: 0.15 K/UL (ref 0.05–0.5)
EOSINOPHILS RELATIVE PERCENT: 3 % (ref 0–6)
ERYTHROCYTE [DISTWIDTH] IN BLOOD BY AUTOMATED COUNT: 13.9 % (ref 11.5–15)
GFR SERPL CREATININE-BSD FRML MDRD: >60 ML/MIN/1.73M2
GLUCOSE SERPL-MCNC: 86 MG/DL (ref 74–99)
HCT VFR BLD AUTO: 39.6 % (ref 34–48)
HGB BLD-MCNC: 12.6 G/DL (ref 11.5–15.5)
IMM GRANULOCYTES # BLD AUTO: <0.03 K/UL (ref 0–0.58)
IMM GRANULOCYTES NFR BLD: 0 % (ref 0–5)
LYMPHOCYTES NFR BLD: 1.52 K/UL (ref 1.5–4)
LYMPHOCYTES RELATIVE PERCENT: 25 % (ref 20–42)
MCH RBC QN AUTO: 30.8 PG (ref 26–35)
MCHC RBC AUTO-ENTMCNC: 31.8 G/DL (ref 32–34.5)
MCV RBC AUTO: 96.8 FL (ref 80–99.9)
MONOCYTES NFR BLD: 0.61 K/UL (ref 0.1–0.95)
MONOCYTES NFR BLD: 10 % (ref 2–12)
NEUTROPHILS NFR BLD: 62 % (ref 43–80)
NEUTS SEG NFR BLD: 3.77 K/UL (ref 1.8–7.3)
PLATELET # BLD AUTO: 279 K/UL (ref 130–450)
PMV BLD AUTO: 8.5 FL (ref 7–12)
POTASSIUM SERPL-SCNC: 3.6 MMOL/L (ref 3.5–5)
PROT SERPL-MCNC: 4.8 G/DL (ref 6.4–8.3)
RBC # BLD AUTO: 4.09 M/UL (ref 3.5–5.5)
SODIUM SERPL-SCNC: 143 MMOL/L (ref 132–146)
T4 FREE SERPL-MCNC: 0.9 NG/DL (ref 0.9–1.7)
TROPONIN I SERPL HS-MCNC: 12 NG/L (ref 0–9)
TSH SERPL DL<=0.05 MIU/L-ACNC: 22.72 UIU/ML (ref 0.27–4.2)
WBC OTHER # BLD: 6.1 K/UL (ref 4.5–11.5)

## 2023-08-19 PROCEDURE — 2580000003 HC RX 258: Performed by: FAMILY MEDICINE

## 2023-08-19 PROCEDURE — 3600000005 HC SURGERY LEVEL 5 BASE: Performed by: ORTHOPAEDIC SURGERY

## 2023-08-19 PROCEDURE — C1776 JOINT DEVICE (IMPLANTABLE): HCPCS | Performed by: ORTHOPAEDIC SURGERY

## 2023-08-19 PROCEDURE — 2580000003 HC RX 258

## 2023-08-19 PROCEDURE — 88311 DECALCIFY TISSUE: CPT

## 2023-08-19 PROCEDURE — 6360000002 HC RX W HCPCS: Performed by: STUDENT IN AN ORGANIZED HEALTH CARE EDUCATION/TRAINING PROGRAM

## 2023-08-19 PROCEDURE — 6360000002 HC RX W HCPCS: Performed by: FAMILY MEDICINE

## 2023-08-19 PROCEDURE — 7100000000 HC PACU RECOVERY - FIRST 15 MIN: Performed by: ORTHOPAEDIC SURGERY

## 2023-08-19 PROCEDURE — 3700000000 HC ANESTHESIA ATTENDED CARE: Performed by: ORTHOPAEDIC SURGERY

## 2023-08-19 PROCEDURE — 6370000000 HC RX 637 (ALT 250 FOR IP): Performed by: NURSE PRACTITIONER

## 2023-08-19 PROCEDURE — 6370000000 HC RX 637 (ALT 250 FOR IP): Performed by: FAMILY MEDICINE

## 2023-08-19 PROCEDURE — 3700000001 HC ADD 15 MINUTES (ANESTHESIA): Performed by: ORTHOPAEDIC SURGERY

## 2023-08-19 PROCEDURE — 93005 ELECTROCARDIOGRAM TRACING: CPT

## 2023-08-19 PROCEDURE — 6360000002 HC RX W HCPCS: Performed by: ORTHOPAEDIC SURGERY

## 2023-08-19 PROCEDURE — 2580000003 HC RX 258: Performed by: NURSE ANESTHETIST, CERTIFIED REGISTERED

## 2023-08-19 PROCEDURE — 88305 TISSUE EXAM BY PATHOLOGIST: CPT

## 2023-08-19 PROCEDURE — 84443 ASSAY THYROID STIM HORMONE: CPT

## 2023-08-19 PROCEDURE — 85025 COMPLETE CBC W/AUTO DIFF WBC: CPT

## 2023-08-19 PROCEDURE — 7100000001 HC PACU RECOVERY - ADDTL 15 MIN: Performed by: ORTHOPAEDIC SURGERY

## 2023-08-19 PROCEDURE — 84439 ASSAY OF FREE THYROXINE: CPT

## 2023-08-19 PROCEDURE — 6360000002 HC RX W HCPCS: Performed by: NURSE ANESTHETIST, CERTIFIED REGISTERED

## 2023-08-19 PROCEDURE — 2580000003 HC RX 258: Performed by: ORTHOPAEDIC SURGERY

## 2023-08-19 PROCEDURE — 80053 COMPREHEN METABOLIC PANEL: CPT

## 2023-08-19 PROCEDURE — 36415 COLL VENOUS BLD VENIPUNCTURE: CPT

## 2023-08-19 PROCEDURE — 2709999900 HC NON-CHARGEABLE SUPPLY: Performed by: ORTHOPAEDIC SURGERY

## 2023-08-19 PROCEDURE — 6360000002 HC RX W HCPCS: Performed by: NURSE PRACTITIONER

## 2023-08-19 PROCEDURE — 84484 ASSAY OF TROPONIN QUANT: CPT

## 2023-08-19 PROCEDURE — 3600000015 HC SURGERY LEVEL 5 ADDTL 15MIN: Performed by: ORTHOPAEDIC SURGERY

## 2023-08-19 PROCEDURE — 73502 X-RAY EXAM HIP UNI 2-3 VIEWS: CPT

## 2023-08-19 PROCEDURE — 2500000003 HC RX 250 WO HCPCS

## 2023-08-19 PROCEDURE — 0SRS0JZ REPLACEMENT OF LEFT HIP JOINT, FEMORAL SURFACE WITH SYNTHETIC SUBSTITUTE, OPEN APPROACH: ICD-10-PCS | Performed by: ORTHOPAEDIC SURGERY

## 2023-08-19 PROCEDURE — 1200000000 HC SEMI PRIVATE

## 2023-08-19 DEVICE — ARTICUL/EZE FEMORAL HEAD DIAMETER 28MM +5 12/14 TAPER
Type: IMPLANTABLE DEVICE | Site: HIP | Status: FUNCTIONAL
Brand: ARTICUL/EZE

## 2023-08-19 DEVICE — HIP H4 HEMI UNI BIPLR IMPL CAPPED H4: Type: IMPLANTABLE DEVICE | Site: HIP | Status: FUNCTIONAL

## 2023-08-19 DEVICE — SUMMIT FEMORAL STEM 12/14 TAPER BASIC PRESS-FIT SIZE 5 145MM
Type: IMPLANTABLE DEVICE | Site: HIP | Status: FUNCTIONAL
Brand: SUMMIT

## 2023-08-19 DEVICE — SELF CENTERING BI-POLAR HEAD 28MM ID 44MM OD
Type: IMPLANTABLE DEVICE | Site: HIP | Status: FUNCTIONAL
Brand: SELF CENTERING

## 2023-08-19 RX ORDER — LIDOCAINE HYDROCHLORIDE 20 MG/ML
INJECTION, SOLUTION INTRAVENOUS PRN
Status: DISCONTINUED | OUTPATIENT
Start: 2023-08-19 | End: 2023-08-19 | Stop reason: SDUPTHER

## 2023-08-19 RX ORDER — FENTANYL CITRATE 50 UG/ML
INJECTION, SOLUTION INTRAMUSCULAR; INTRAVENOUS PRN
Status: DISCONTINUED | OUTPATIENT
Start: 2023-08-19 | End: 2023-08-19 | Stop reason: SDUPTHER

## 2023-08-19 RX ORDER — OXYCODONE HYDROCHLORIDE AND ACETAMINOPHEN 5; 325 MG/1; MG/1
1 TABLET ORAL EVERY 6 HOURS PRN
Qty: 28 TABLET | Refills: 0 | Status: SHIPPED | OUTPATIENT
Start: 2023-08-19 | End: 2023-08-26

## 2023-08-19 RX ORDER — ASPIRIN 325 MG
325 TABLET, DELAYED RELEASE (ENTERIC COATED) ORAL 2 TIMES DAILY
Qty: 56 TABLET | Refills: 0 | Status: SHIPPED | OUTPATIENT
Start: 2023-08-19 | End: 2023-09-16

## 2023-08-19 RX ORDER — BUPIVACAINE HYDROCHLORIDE 7.5 MG/ML
INJECTION, SOLUTION INTRASPINAL
Status: COMPLETED | OUTPATIENT
Start: 2023-08-19 | End: 2023-08-19

## 2023-08-19 RX ORDER — ERGOCALCIFEROL 1.25 MG/1
50000 CAPSULE ORAL WEEKLY
Status: DISCONTINUED | OUTPATIENT
Start: 2023-08-19 | End: 2023-08-21 | Stop reason: HOSPADM

## 2023-08-19 RX ORDER — VANCOMYCIN HYDROCHLORIDE 1 G/20ML
INJECTION, POWDER, LYOPHILIZED, FOR SOLUTION INTRAVENOUS PRN
Status: DISCONTINUED | OUTPATIENT
Start: 2023-08-19 | End: 2023-08-19 | Stop reason: HOSPADM

## 2023-08-19 RX ORDER — ONDANSETRON 2 MG/ML
4 INJECTION INTRAMUSCULAR; INTRAVENOUS EVERY 6 HOURS PRN
Status: DISCONTINUED | OUTPATIENT
Start: 2023-08-19 | End: 2023-08-21 | Stop reason: HOSPADM

## 2023-08-19 RX ORDER — SODIUM CHLORIDE, SODIUM LACTATE, POTASSIUM CHLORIDE, CALCIUM CHLORIDE 600; 310; 30; 20 MG/100ML; MG/100ML; MG/100ML; MG/100ML
INJECTION, SOLUTION INTRAVENOUS CONTINUOUS PRN
Status: DISCONTINUED | OUTPATIENT
Start: 2023-08-19 | End: 2023-08-19 | Stop reason: SDUPTHER

## 2023-08-19 RX ORDER — SODIUM CHLORIDE 9 MG/ML
INJECTION, SOLUTION INTRAVENOUS PRN
Status: DISCONTINUED | OUTPATIENT
Start: 2023-08-19 | End: 2023-08-21 | Stop reason: HOSPADM

## 2023-08-19 RX ORDER — CEFAZOLIN SODIUM 2 G/50ML
2000 SOLUTION INTRAVENOUS
Status: DISCONTINUED | OUTPATIENT
Start: 2023-08-19 | End: 2023-08-19

## 2023-08-19 RX ORDER — ASPIRIN 325 MG
325 TABLET, DELAYED RELEASE (ENTERIC COATED) ORAL 2 TIMES DAILY
Status: DISCONTINUED | OUTPATIENT
Start: 2023-08-20 | End: 2023-08-21 | Stop reason: HOSPADM

## 2023-08-19 RX ORDER — SODIUM CHLORIDE 0.9 % (FLUSH) 0.9 %
5-40 SYRINGE (ML) INJECTION PRN
Status: DISCONTINUED | OUTPATIENT
Start: 2023-08-19 | End: 2023-08-21 | Stop reason: HOSPADM

## 2023-08-19 RX ORDER — PROPOFOL 10 MG/ML
INJECTION, EMULSION INTRAVENOUS PRN
Status: DISCONTINUED | OUTPATIENT
Start: 2023-08-19 | End: 2023-08-19 | Stop reason: SDUPTHER

## 2023-08-19 RX ORDER — SODIUM CHLORIDE 0.9 % (FLUSH) 0.9 %
5-40 SYRINGE (ML) INJECTION EVERY 12 HOURS SCHEDULED
Status: DISCONTINUED | OUTPATIENT
Start: 2023-08-19 | End: 2023-08-21 | Stop reason: HOSPADM

## 2023-08-19 RX ORDER — ONDANSETRON 4 MG/1
4 TABLET, ORALLY DISINTEGRATING ORAL EVERY 8 HOURS PRN
Status: DISCONTINUED | OUTPATIENT
Start: 2023-08-19 | End: 2023-08-21 | Stop reason: HOSPADM

## 2023-08-19 RX ORDER — HYDRALAZINE HYDROCHLORIDE 20 MG/ML
10 INJECTION INTRAMUSCULAR; INTRAVENOUS EVERY 6 HOURS PRN
Status: DISCONTINUED | OUTPATIENT
Start: 2023-08-19 | End: 2023-08-21 | Stop reason: HOSPADM

## 2023-08-19 RX ORDER — CEFAZOLIN SODIUM 2 G/50ML
2000 SOLUTION INTRAVENOUS
Status: DISCONTINUED | OUTPATIENT
Start: 2023-08-19 | End: 2023-08-19 | Stop reason: SDUPTHER

## 2023-08-19 RX ADMIN — OXYCODONE AND ACETAMINOPHEN 1 TABLET: 325; 5 TABLET ORAL at 22:42

## 2023-08-19 RX ADMIN — FENTANYL CITRATE 25 MCG: 0.05 INJECTION, SOLUTION INTRAMUSCULAR; INTRAVENOUS at 14:39

## 2023-08-19 RX ADMIN — SODIUM CHLORIDE, POTASSIUM CHLORIDE, SODIUM LACTATE AND CALCIUM CHLORIDE: 600; 310; 30; 20 INJECTION, SOLUTION INTRAVENOUS at 09:49

## 2023-08-19 RX ADMIN — OXYCODONE AND ACETAMINOPHEN 1 TABLET: 325; 5 TABLET ORAL at 13:24

## 2023-08-19 RX ADMIN — CEFAZOLIN 2000 MG: 2 INJECTION, POWDER, FOR SOLUTION INTRAMUSCULAR; INTRAVENOUS at 10:04

## 2023-08-19 RX ADMIN — ERGOCALCIFEROL 50000 UNITS: 1.25 CAPSULE ORAL at 17:29

## 2023-08-19 RX ADMIN — FENTANYL CITRATE 25 MCG: 0.05 INJECTION, SOLUTION INTRAMUSCULAR; INTRAVENOUS at 18:54

## 2023-08-19 RX ADMIN — PROPOFOL 15 MCG/KG/MIN: 10 INJECTION, EMULSION INTRAVENOUS at 10:16

## 2023-08-19 RX ADMIN — HYDRALAZINE HYDROCHLORIDE 10 MG: 20 INJECTION INTRAMUSCULAR; INTRAVENOUS at 17:30

## 2023-08-19 RX ADMIN — LIDOCAINE HYDROCHLORIDE 20 MG: 20 INJECTION, SOLUTION INTRAVENOUS at 10:15

## 2023-08-19 RX ADMIN — CEFAZOLIN 1000 MG: 1 INJECTION, POWDER, FOR SOLUTION INTRAMUSCULAR; INTRAVENOUS at 17:29

## 2023-08-19 RX ADMIN — SODIUM CHLORIDE: 9 INJECTION, SOLUTION INTRAVENOUS at 12:38

## 2023-08-19 RX ADMIN — PROPOFOL 15 MG: 10 INJECTION, EMULSION INTRAVENOUS at 10:21

## 2023-08-19 RX ADMIN — PROPOFOL 10 MG: 10 INJECTION, EMULSION INTRAVENOUS at 10:15

## 2023-08-19 RX ADMIN — BUPIVACAINE HYDROCHLORIDE IN DEXTROSE 15 MG: 7.5 INJECTION, SOLUTION SUBARACHNOID at 10:03

## 2023-08-19 RX ADMIN — FENTANYL CITRATE 25 MCG: 50 INJECTION, SOLUTION INTRAMUSCULAR; INTRAVENOUS at 10:00

## 2023-08-19 RX ADMIN — FENTANYL CITRATE 25 MCG: 50 INJECTION, SOLUTION INTRAMUSCULAR; INTRAVENOUS at 09:55

## 2023-08-19 RX ADMIN — TRANEXAMIC ACID 1000 MG: 100 INJECTION, SOLUTION INTRAVENOUS at 10:13

## 2023-08-19 RX ADMIN — SODIUM CHLORIDE, POTASSIUM CHLORIDE, SODIUM LACTATE AND CALCIUM CHLORIDE: 600; 310; 30; 20 INJECTION, SOLUTION INTRAVENOUS at 10:16

## 2023-08-19 ASSESSMENT — PAIN DESCRIPTION - LOCATION
LOCATION: HIP

## 2023-08-19 ASSESSMENT — PAIN SCALES - WONG BAKER: WONGBAKER_NUMERICALRESPONSE: 8

## 2023-08-19 ASSESSMENT — PAIN DESCRIPTION - DESCRIPTORS
DESCRIPTORS: ACHING;SORE;TENDER
DESCRIPTORS: ACHING
DESCRIPTORS: SORE;ACHING;TENDER
DESCRIPTORS: ACHING;SORE;TENDER

## 2023-08-19 ASSESSMENT — PAIN DESCRIPTION - ORIENTATION
ORIENTATION: LEFT

## 2023-08-19 ASSESSMENT — PAIN SCALES - GENERAL
PAINLEVEL_OUTOF10: 4
PAINLEVEL_OUTOF10: 8
PAINLEVEL_OUTOF10: 6
PAINLEVEL_OUTOF10: 8

## 2023-08-19 NOTE — PLAN OF CARE
Problem: Discharge Planning  Goal: Discharge to home or other facility with appropriate resources  8/18/2023 2244 by Gwen Serrano RN  Outcome: Progressing  8/18/2023 1324 by Urvashi Zarate RN  Outcome: Progressing     Problem: Skin/Tissue Integrity  Goal: Absence of new skin breakdown  Description: 1. Monitor for areas of redness and/or skin breakdown  2. Assess vascular access sites hourly  3. Every 4-6 hours minimum:  Change oxygen saturation probe site  4. Every 4-6 hours:  If on nasal continuous positive airway pressure, respiratory therapy assess nares and determine need for appliance change or resting period.   8/18/2023 2244 by Gwen Serrano RN  Outcome: Progressing  8/18/2023 1324 by Urvashi Zarate RN  Outcome: Progressing     Problem: Safety - Adult  Goal: Free from fall injury  8/18/2023 2244 by Gwen Serrano RN  Outcome: Progressing  8/18/2023 1324 by Urvashi Zarate RN  Outcome: Progressing     Problem: ABCDS Injury Assessment  Goal: Absence of physical injury  8/18/2023 2244 by Gwen Serrano RN  Outcome: Progressing  8/18/2023 1324 by Urvashi Zarate RN  Outcome: Progressing     Problem: Pain  Goal: Verbalizes/displays adequate comfort level or baseline comfort level  8/18/2023 2244 by Gwen Serrano RN  Outcome: Progressing  8/18/2023 1324 by Urvashi Zarate RN  Outcome: Progressing

## 2023-08-19 NOTE — PLAN OF CARE
Problem: Discharge Planning  Goal: Discharge to home or other facility with appropriate resources  8/19/2023 1123 by Suzanne Hurst RN  Outcome: Progressing  8/18/2023 2244 by Emma Horn RN  Outcome: Progressing     Problem: Skin/Tissue Integrity  Goal: Absence of new skin breakdown  Description: 1. Monitor for areas of redness and/or skin breakdown  2. Assess vascular access sites hourly  3. Every 4-6 hours minimum:  Change oxygen saturation probe site  4. Every 4-6 hours:  If on nasal continuous positive airway pressure, respiratory therapy assess nares and determine need for appliance change or resting period.   8/19/2023 1123 by Suzanne Hurst RN  Outcome: Progressing  8/18/2023 2244 by Emma Horn RN  Outcome: Progressing     Problem: Safety - Adult  Goal: Free from fall injury  8/19/2023 1123 by Suzanne Hurst RN  Outcome: Progressing  8/18/2023 2244 by Emma Horn RN  Outcome: Progressing     Problem: ABCDS Injury Assessment  Goal: Absence of physical injury  8/19/2023 1123 by Suzanne Hurst RN  Outcome: Progressing  8/18/2023 2244 by Emma Horn RN  Outcome: Progressing     Problem: Pain  Goal: Verbalizes/displays adequate comfort level or baseline comfort level  8/19/2023 1123 by Suzanne Hurst RN  Outcome: Progressing  8/18/2023 2244 by Emma Horn RN  Outcome: Progressing

## 2023-08-19 NOTE — OP NOTE
Operative Note      Patient: Ezequiel aMlone  YOB: 1940  MRN: [id number]    Date of Procedure: 8/19/2023    Pre-Op Diagnosis Codes:     * Closed fracture of left hip, initial encounter ([address] ) [S72.002A]    Post-Op Diagnosis: Same       Procedure(s):  LEFT HIP HEMIARTHROPLASTY    Surgeon(s): Eva Edmonds DO    Assistant:   Resident: Jamar Srivastava DO; Katie Gray DO    Anesthesia: General    Estimated Blood Loss (mL): less than 502     Complications: None    Specimens:   ID Type Source Tests Collected by Time Destination   A : Bone left hip Bone Bone SURGICAL PATHOLOGY Eva Edmonds DO 8/19/2023 0827        Implants:  Implant Name Type Inv. Item Serial No.  Lot No. LRB No. Used Action   HEAD FEM [id number] +5MM OFFSET STD 12/14 TAPR ARTC EZ HIP MTL - [id number]  HEAD FEM [id number] +5MM OFFSET STD 12/14 TAPR ARTC EZ HIP MTL  [employer]- [id number] Left 1 Implanted   HEAD BPLR OD44MM ID28MM FEM HIP GRY POS ECC SELF CNTR - [id number]  HEAD BPLR OD44MM ID28MM FEM HIP GRY POS ECC SELF CNTR  [facility] [employer]- [id number] Left 1 Implanted   STEM FEM SZ 5 L145MM NK L35MM 42MM OFFSET 130DEG BILAT HIP - [id number]  STEM FEM SZ 5 L145MM NK L35MM 42MM OFFSET 130DEG BILAT HIP  [facility] [employer]- [id number] Left 1 Implanted         Drains:   Urinary Catheter 08/17/23 (Active)   $ Urethral catheter insertion Inserted for procedure 08/17/23 2235   Catheter Indications Perioperative use for selected surgical procedures 08/18/23 2238   Site Assessment [place] 08/18/23 2238   Urine Color Yellow 08/18/23 2238   Urine Appearance Clear 08/18/23 2238   Collection Container Standard 08/18/23 2238   Catheter Care  Other (comment) 08/18/23 1304   Catheter Best Practices  Drainage tube clipped to bed;Catheter secured to thigh; Tamper seal intact; Bag below bladder;Drainage bag less than half full;Bag not on floor; Lack of dependent loop in tubing 08/18/23 2238   Status

## 2023-08-19 NOTE — ANESTHESIA PROCEDURE NOTES
Spinal Block    Patient location during procedure: OB  End time: 8/19/2023 10:03 AM  Reason for block: primary anesthetic and at surgeon's request  Staffing  Performed: resident/CRNA   Anesthesiologist: Jerry Antony MD  Resident/CRNA: WENDI Goodwin CRNA  Spinal Block  Patient position: sitting  Prep: Betadine  Patient monitoring: cardiac monitor, continuous pulse ox, continuous capnometry and frequent blood pressure checks  Approach: midline  Location: L3/L4  Provider prep: mask, sterile gloves and sterile gown  Local infiltration: lidocaine  Needle  Needle type: Pencan   Needle gauge: 25 G  Needle length: 3.5 in  Assessment  Sensory level: T10  Swirl obtained: Yes  CSF: clear  Attempts: 1  Hemodynamics: stable  Preanesthetic Checklist  Completed: patient identified, IV checked, site marked, risks and benefits discussed, surgical/procedural consents, equipment checked, pre-op evaluation, timeout performed, anesthesia consent given, oxygen available and monitors applied/VS acknowledged

## 2023-08-19 NOTE — ANESTHESIA POSTPROCEDURE EVALUATION
Department of Anesthesiology  Postprocedure Note    Patient: Severa Polite  MRN: [id number]  YOB: 1940  Date of evaluation: 8/19/2023      Procedure Summary     Date: 08/19/23 Room / Location: [address] [place] [address] [facility] Sami [facility]    Anesthesia Start: 2300 Anesthesia Stop: 1291    Procedure: LEFT HIP HEMIARTHROPLASTY (Left: Hip) Diagnosis:       Closed fracture of left hip, initial encounter ([address])      (Closed fracture of left hip, initial encounter ([address]) Nomi Hyde)    Surgeons: Roldan Buitrago DO Responsible Provider: Reena Oro MD    Anesthesia Type: spinal ASA Status: 3          Anesthesia Type: No value filed.     Steve Phase I: Steve Score: 10    Steve Phase II:        Anesthesia Post Evaluation    Patient location during evaluation: PACU  Patient participation: complete - patient participated  Level of consciousness: awake (on baseline for her dementia)  Airway patency: patent  Nausea & Vomiting: no nausea and no vomiting  Complications: no  Cardiovascular status: hemodynamically stable  Respiratory status: acceptable  Hydration status: euvolemic  Pain management: adequate

## 2023-08-20 LAB
ALBUMIN SERPL-MCNC: 2.5 G/DL (ref 3.5–5.2)
ALP SERPL-CCNC: 174 U/L (ref 35–104)
ALT SERPL-CCNC: 8 U/L (ref 0–32)
ANION GAP SERPL CALCULATED.3IONS-SCNC: 8 MMOL/L (ref 7–16)
AST SERPL-CCNC: 16 U/L (ref 0–31)
BASOPHILS # BLD: 0.05 K/UL (ref 0–0.2)
BASOPHILS NFR BLD: 1 % (ref 0–2)
BILIRUB SERPL-MCNC: 0.3 MG/DL (ref 0–1.2)
BUN SERPL-MCNC: 8 MG/DL (ref 6–23)
CALCIUM SERPL-MCNC: 9.3 MG/DL (ref 8.6–10.2)
CHLORIDE SERPL-SCNC: 105 MMOL/L (ref 98–107)
CO2 SERPL-SCNC: 25 MMOL/L (ref 22–29)
CREAT SERPL-MCNC: 0.4 MG/DL (ref 0.5–1)
EKG ATRIAL RATE: 72 BPM
EKG P AXIS: 81 DEGREES
EKG P-R INTERVAL: 182 MS
EKG Q-T INTERVAL: 392 MS
EKG QRS DURATION: 84 MS
EKG QTC CALCULATION (BAZETT): 429 MS
EKG R AXIS: 10 DEGREES
EKG T AXIS: 83 DEGREES
EKG VENTRICULAR RATE: 72 BPM
EOSINOPHIL # BLD: 0.02 K/UL (ref 0.05–0.5)
EOSINOPHILS RELATIVE PERCENT: 0 % (ref 0–6)
ERYTHROCYTE [DISTWIDTH] IN BLOOD BY AUTOMATED COUNT: 13.6 % (ref 11.5–15)
GFR SERPL CREATININE-BSD FRML MDRD: >60 ML/MIN/1.73M2
GLUCOSE BLD-MCNC: 126 MG/DL (ref 74–99)
GLUCOSE SERPL-MCNC: 110 MG/DL (ref 74–99)
HCT VFR BLD AUTO: 39.9 % (ref 34–48)
HGB BLD-MCNC: 12.8 G/DL (ref 11.5–15.5)
IMM GRANULOCYTES # BLD AUTO: 0.05 K/UL (ref 0–0.58)
IMM GRANULOCYTES NFR BLD: 1 % (ref 0–5)
LYMPHOCYTES NFR BLD: 1.12 K/UL (ref 1.5–4)
LYMPHOCYTES RELATIVE PERCENT: 14 % (ref 20–42)
MCH RBC QN AUTO: 30.5 PG (ref 26–35)
MCHC RBC AUTO-ENTMCNC: 32.1 G/DL (ref 32–34.5)
MCV RBC AUTO: 95 FL (ref 80–99.9)
MONOCYTES NFR BLD: 0.88 K/UL (ref 0.1–0.95)
MONOCYTES NFR BLD: 11 % (ref 2–12)
NEUTROPHILS NFR BLD: 74 % (ref 43–80)
NEUTS SEG NFR BLD: 5.86 K/UL (ref 1.8–7.3)
PLATELET # BLD AUTO: 317 K/UL (ref 130–450)
PMV BLD AUTO: 8.4 FL (ref 7–12)
POTASSIUM SERPL-SCNC: 3.4 MMOL/L (ref 3.5–5)
PROT SERPL-MCNC: 4.8 G/DL (ref 6.4–8.3)
RBC # BLD AUTO: 4.2 M/UL (ref 3.5–5.5)
SODIUM SERPL-SCNC: 138 MMOL/L (ref 132–146)
TROPONIN I SERPL HS-MCNC: 11 NG/L (ref 0–9)
WBC OTHER # BLD: 8 K/UL (ref 4.5–11.5)

## 2023-08-20 PROCEDURE — 97110 THERAPEUTIC EXERCISES: CPT | Performed by: PHYSICAL THERAPIST

## 2023-08-20 PROCEDURE — 36415 COLL VENOUS BLD VENIPUNCTURE: CPT

## 2023-08-20 PROCEDURE — 97165 OT EVAL LOW COMPLEX 30 MIN: CPT | Performed by: OCCUPATIONAL THERAPIST

## 2023-08-20 PROCEDURE — 80053 COMPREHEN METABOLIC PANEL: CPT

## 2023-08-20 PROCEDURE — 6370000000 HC RX 637 (ALT 250 FOR IP): Performed by: NURSE PRACTITIONER

## 2023-08-20 PROCEDURE — 2580000003 HC RX 258: Performed by: FAMILY MEDICINE

## 2023-08-20 PROCEDURE — 6370000000 HC RX 637 (ALT 250 FOR IP): Performed by: INTERNAL MEDICINE

## 2023-08-20 PROCEDURE — 6360000002 HC RX W HCPCS: Performed by: FAMILY MEDICINE

## 2023-08-20 PROCEDURE — 85025 COMPLETE CBC W/AUTO DIFF WBC: CPT

## 2023-08-20 PROCEDURE — 97535 SELF CARE MNGMENT TRAINING: CPT | Performed by: OCCUPATIONAL THERAPIST

## 2023-08-20 PROCEDURE — 97530 THERAPEUTIC ACTIVITIES: CPT | Performed by: OCCUPATIONAL THERAPIST

## 2023-08-20 PROCEDURE — 82962 GLUCOSE BLOOD TEST: CPT

## 2023-08-20 PROCEDURE — 6370000000 HC RX 637 (ALT 250 FOR IP): Performed by: FAMILY MEDICINE

## 2023-08-20 PROCEDURE — 6360000002 HC RX W HCPCS: Performed by: NURSE PRACTITIONER

## 2023-08-20 PROCEDURE — 1200000000 HC SEMI PRIVATE

## 2023-08-20 PROCEDURE — 93005 ELECTROCARDIOGRAM TRACING: CPT | Performed by: INTERNAL MEDICINE

## 2023-08-20 PROCEDURE — 97161 PT EVAL LOW COMPLEX 20 MIN: CPT | Performed by: PHYSICAL THERAPIST

## 2023-08-20 PROCEDURE — 84484 ASSAY OF TROPONIN QUANT: CPT

## 2023-08-20 RX ORDER — HYDROCODONE BITARTRATE AND ACETAMINOPHEN 5; 325 MG/1; MG/1
1 TABLET ORAL EVERY 8 HOURS PRN
Status: DISCONTINUED | OUTPATIENT
Start: 2023-08-20 | End: 2023-08-21 | Stop reason: HOSPADM

## 2023-08-20 RX ORDER — HYDROCODONE BITARTRATE AND ACETAMINOPHEN 5; 325 MG/1; MG/1
1 TABLET ORAL EVERY 8 HOURS
Status: DISCONTINUED | OUTPATIENT
Start: 2023-08-20 | End: 2023-08-21 | Stop reason: HOSPADM

## 2023-08-20 RX ORDER — POTASSIUM CHLORIDE 20 MEQ/1
20 TABLET, EXTENDED RELEASE ORAL ONCE
Status: COMPLETED | OUTPATIENT
Start: 2023-08-20 | End: 2023-08-20

## 2023-08-20 RX ORDER — CALCIUM CARBONATE 500 MG/1
500 TABLET, CHEWABLE ORAL 3 TIMES DAILY PRN
Status: DISCONTINUED | OUTPATIENT
Start: 2023-08-20 | End: 2023-08-21 | Stop reason: HOSPADM

## 2023-08-20 RX ADMIN — HYDRALAZINE HYDROCHLORIDE 10 MG: 20 INJECTION INTRAMUSCULAR; INTRAVENOUS at 05:42

## 2023-08-20 RX ADMIN — HYDROCODONE BITARTRATE AND ACETAMINOPHEN 1 TABLET: 5; 325 TABLET ORAL at 16:08

## 2023-08-20 RX ADMIN — POTASSIUM CHLORIDE 20 MEQ: 1500 TABLET, EXTENDED RELEASE ORAL at 08:16

## 2023-08-20 RX ADMIN — LEVOTHYROXINE SODIUM 137 MCG: 25 TABLET ORAL at 05:43

## 2023-08-20 RX ADMIN — HYDRALAZINE HYDROCHLORIDE 10 MG: 20 INJECTION INTRAMUSCULAR; INTRAVENOUS at 00:17

## 2023-08-20 RX ADMIN — CEFAZOLIN 1000 MG: 1 INJECTION, POWDER, FOR SOLUTION INTRAMUSCULAR; INTRAVENOUS at 01:32

## 2023-08-20 RX ADMIN — SERTRALINE 25 MG: 50 TABLET, FILM COATED ORAL at 08:16

## 2023-08-20 RX ADMIN — FENTANYL CITRATE 25 MCG: 0.05 INJECTION, SOLUTION INTRAMUSCULAR; INTRAVENOUS at 01:31

## 2023-08-20 RX ADMIN — FUROSEMIDE 20 MG: 20 TABLET ORAL at 08:16

## 2023-08-20 RX ADMIN — ASPIRIN 325 MG: 325 TABLET, COATED ORAL at 08:16

## 2023-08-20 RX ADMIN — HYDROCODONE BITARTRATE AND ACETAMINOPHEN 1 TABLET: 5; 325 TABLET ORAL at 08:16

## 2023-08-20 RX ADMIN — CALCIUM CARBONATE 500 MG: 500 TABLET, CHEWABLE ORAL at 21:53

## 2023-08-20 ASSESSMENT — PAIN DESCRIPTION - ORIENTATION: ORIENTATION: LEFT

## 2023-08-20 ASSESSMENT — PAIN DESCRIPTION - LOCATION: LOCATION: HIP

## 2023-08-20 ASSESSMENT — PAIN SCALES - WONG BAKER: WONGBAKER_NUMERICALRESPONSE: 8

## 2023-08-20 ASSESSMENT — PAIN SCALES - GENERAL
PAINLEVEL_OUTOF10: 6
PAINLEVEL_OUTOF10: 8

## 2023-08-20 ASSESSMENT — PAIN DESCRIPTION - DESCRIPTORS: DESCRIPTORS: OTHER (COMMENT)

## 2023-08-20 NOTE — PLAN OF CARE
Problem: Discharge Planning  Goal: Discharge to home or other facility with appropriate resources  Outcome: Progressing     Problem: Skin/Tissue Integrity  Goal: Absence of new skin breakdown  Description: 1. Monitor for areas of redness and/or skin breakdown  2. Assess vascular access sites hourly  3. Every 4-6 hours minimum:  Change oxygen saturation probe site  4. Every 4-6 hours:  If on nasal continuous positive airway pressure, respiratory therapy assess nares and determine need for appliance change or resting period.   Outcome: Progressing     Problem: Safety - Adult  Goal: Free from fall injury  Outcome: Progressing     Problem: ABCDS Injury Assessment  Goal: Absence of physical injury  Outcome: Progressing     Problem: Pain  Goal: Verbalizes/displays adequate comfort level or baseline comfort level  Outcome: Progressing     Problem: Neurosensory - Adult  Goal: Achieves maximal functionality and self care  Outcome: Progressing     Problem: Respiratory - Adult  Goal: Achieves optimal ventilation and oxygenation  Outcome: Progressing     Problem: Skin/Tissue Integrity - Adult  Goal: Skin integrity remains intact  Outcome: Progressing     Problem: Musculoskeletal - Adult  Goal: Return mobility to safest level of function  Outcome: Progressing  Goal: Return ADL status to a safe level of function  Outcome: Progressing     Problem: Genitourinary - Adult  Goal: Absence of urinary retention  Outcome: Progressing  Goal: Urinary catheter remains patent  Outcome: Progressing

## 2023-08-20 NOTE — PLAN OF CARE
Problem: Discharge Planning  Goal: Discharge to home or other facility with appropriate resources  8/19/2023 2108 by Monica Jorge RN  Outcome: Progressing  8/19/2023 1123 by Casimiro Alves RN  Outcome: Progressing     Problem: Skin/Tissue Integrity  Goal: Absence of new skin breakdown  Description: 1. Monitor for areas of redness and/or skin breakdown  2. Assess vascular access sites hourly  3. Every 4-6 hours minimum:  Change oxygen saturation probe site  4. Every 4-6 hours:  If on nasal continuous positive airway pressure, respiratory therapy assess nares and determine need for appliance change or resting period.   8/19/2023 2108 by Monica Jorge RN  Outcome: Progressing  8/19/2023 1123 by Casimiro Alves RN  Outcome: Progressing     Problem: Safety - Adult  Goal: Free from fall injury  8/19/2023 2108 by Monica Jorge RN  Outcome: Progressing  8/19/2023 1123 by Casimiro Alves RN  Outcome: Progressing     Problem: ABCDS Injury Assessment  Goal: Absence of physical injury  8/19/2023 2108 by Monica Jorge RN  Outcome: Progressing  8/19/2023 1123 by Casimiro Alves RN  Outcome: Progressing     Problem: Pain  Goal: Verbalizes/displays adequate comfort level or baseline comfort level  8/19/2023 2108 by Monica Jorge RN  Outcome: Progressing  8/19/2023 1123 by Casimiro Alves RN  Outcome: Progressing     Problem: Neurosensory - Adult  Goal: Achieves maximal functionality and self care  Outcome: Progressing     Problem: Respiratory - Adult  Goal: Achieves optimal ventilation and oxygenation  Outcome: Progressing     Problem: Skin/Tissue Integrity - Adult  Goal: Skin integrity remains intact  Outcome: Progressing     Problem: Musculoskeletal - Adult  Goal: Return mobility to safest level of function  Outcome: Progressing  Goal: Return ADL status to a safe level of function  Outcome: Progressing     Problem: Genitourinary - Adult  Goal: Absence of urinary retention  Outcome: Progressing  Goal: Urinary catheter

## 2023-08-21 VITALS
BODY MASS INDEX: 19.17 KG/M2 | RESPIRATION RATE: 16 BRPM | OXYGEN SATURATION: 95 % | WEIGHT: 104.2 LBS | HEIGHT: 62 IN | SYSTOLIC BLOOD PRESSURE: 143 MMHG | DIASTOLIC BLOOD PRESSURE: 77 MMHG | TEMPERATURE: 98.7 F | HEART RATE: 73 BPM

## 2023-08-21 LAB
ALBUMIN SERPL-MCNC: 2 G/DL (ref 3.5–5.2)
ALP SERPL-CCNC: 142 U/L (ref 35–104)
ALT SERPL-CCNC: 6 U/L (ref 0–32)
ANION GAP SERPL CALCULATED.3IONS-SCNC: 4 MMOL/L (ref 7–16)
AST SERPL-CCNC: 13 U/L (ref 0–31)
BASOPHILS # BLD: 0.04 K/UL (ref 0–0.2)
BASOPHILS NFR BLD: 1 % (ref 0–2)
BILIRUB SERPL-MCNC: 0.2 MG/DL (ref 0–1.2)
BUN SERPL-MCNC: 8 MG/DL (ref 6–23)
CALCIUM SERPL-MCNC: 9 MG/DL (ref 8.6–10.2)
CHLORIDE SERPL-SCNC: 107 MMOL/L (ref 98–107)
CO2 SERPL-SCNC: 26 MMOL/L (ref 22–29)
CREAT SERPL-MCNC: 0.3 MG/DL (ref 0.5–1)
EKG ATRIAL RATE: 84 BPM
EKG P AXIS: 76 DEGREES
EKG P-R INTERVAL: 162 MS
EKG Q-T INTERVAL: 352 MS
EKG QRS DURATION: 80 MS
EKG QTC CALCULATION (BAZETT): 415 MS
EKG R AXIS: 32 DEGREES
EKG T AXIS: 63 DEGREES
EKG VENTRICULAR RATE: 84 BPM
EOSINOPHIL # BLD: 0.09 K/UL (ref 0.05–0.5)
EOSINOPHILS RELATIVE PERCENT: 1 % (ref 0–6)
ERYTHROCYTE [DISTWIDTH] IN BLOOD BY AUTOMATED COUNT: 13.8 % (ref 11.5–15)
GFR SERPL CREATININE-BSD FRML MDRD: >60 ML/MIN/1.73M2
GLUCOSE SERPL-MCNC: 98 MG/DL (ref 74–99)
HCT VFR BLD AUTO: 35 % (ref 34–48)
HGB BLD-MCNC: 11.2 G/DL (ref 11.5–15.5)
IMM GRANULOCYTES # BLD AUTO: 0.03 K/UL (ref 0–0.58)
IMM GRANULOCYTES NFR BLD: 0 % (ref 0–5)
LYMPHOCYTES NFR BLD: 1.21 K/UL (ref 1.5–4)
LYMPHOCYTES RELATIVE PERCENT: 17 % (ref 20–42)
MCH RBC QN AUTO: 30.4 PG (ref 26–35)
MCHC RBC AUTO-ENTMCNC: 32 G/DL (ref 32–34.5)
MCV RBC AUTO: 94.9 FL (ref 80–99.9)
MONOCYTES NFR BLD: 0.83 K/UL (ref 0.1–0.95)
MONOCYTES NFR BLD: 12 % (ref 2–12)
NEUTROPHILS NFR BLD: 69 % (ref 43–80)
NEUTS SEG NFR BLD: 4.9 K/UL (ref 1.8–7.3)
PLATELET # BLD AUTO: 305 K/UL (ref 130–450)
PMV BLD AUTO: 8.8 FL (ref 7–12)
POTASSIUM SERPL-SCNC: 3.3 MMOL/L (ref 3.5–5)
PROT SERPL-MCNC: 4.3 G/DL (ref 6.4–8.3)
RBC # BLD AUTO: 3.69 M/UL (ref 3.5–5.5)
SODIUM SERPL-SCNC: 137 MMOL/L (ref 132–146)
WBC OTHER # BLD: 7.1 K/UL (ref 4.5–11.5)

## 2023-08-21 PROCEDURE — 6370000000 HC RX 637 (ALT 250 FOR IP): Performed by: FAMILY MEDICINE

## 2023-08-21 PROCEDURE — 6370000000 HC RX 637 (ALT 250 FOR IP): Performed by: NURSE PRACTITIONER

## 2023-08-21 PROCEDURE — 80053 COMPREHEN METABOLIC PANEL: CPT

## 2023-08-21 PROCEDURE — 36415 COLL VENOUS BLD VENIPUNCTURE: CPT

## 2023-08-21 PROCEDURE — 6370000000 HC RX 637 (ALT 250 FOR IP): Performed by: INTERNAL MEDICINE

## 2023-08-21 PROCEDURE — 85025 COMPLETE CBC W/AUTO DIFF WBC: CPT

## 2023-08-21 PROCEDURE — 93010 ELECTROCARDIOGRAM REPORT: CPT | Performed by: INTERNAL MEDICINE

## 2023-08-21 RX ORDER — LEVOTHYROXINE SODIUM 137 UG/1
137 TABLET ORAL
Qty: 30 TABLET | Refills: 3 | Status: SHIPPED | OUTPATIENT
Start: 2023-08-22

## 2023-08-21 RX ORDER — LORAZEPAM 0.5 MG/1
0.5 TABLET ORAL EVERY 4 HOURS PRN
Qty: 10 TABLET | Refills: 0 | Status: SHIPPED | OUTPATIENT
Start: 2023-08-21 | End: 2023-08-26

## 2023-08-21 RX ADMIN — HYDROCODONE BITARTRATE AND ACETAMINOPHEN 1 TABLET: 5; 325 TABLET ORAL at 06:17

## 2023-08-21 RX ADMIN — HYDROCODONE BITARTRATE AND ACETAMINOPHEN 1 TABLET: 5; 325 TABLET ORAL at 02:54

## 2023-08-21 RX ADMIN — ASPIRIN 325 MG: 325 TABLET, COATED ORAL at 10:38

## 2023-08-21 RX ADMIN — FUROSEMIDE 20 MG: 20 TABLET ORAL at 10:39

## 2023-08-21 RX ADMIN — SERTRALINE 25 MG: 50 TABLET, FILM COATED ORAL at 10:38

## 2023-08-21 RX ADMIN — CALCIUM CARBONATE 500 MG: 500 TABLET, CHEWABLE ORAL at 10:44

## 2023-08-21 RX ADMIN — LEVOTHYROXINE SODIUM 137 MCG: 25 TABLET ORAL at 06:17

## 2023-08-21 ASSESSMENT — PAIN SCALES - GENERAL
PAINLEVEL_OUTOF10: 4
PAINLEVEL_OUTOF10: 7

## 2023-08-21 NOTE — DISCHARGE SUMMARY
Closed left hip fracture, initial encounter ([address] )                Details   levothyroxine (SYNTHROID) 137 MCG tablet Take 1 tablet by mouth every morning (before breakfast)  Qty: 30 tablet, Refills: 3      LORazepam (ATIVAN) 0.5 MG tablet Take 1 tablet by mouth every 4 hours as needed for Anxiety for up to 5 days. Max Daily Amount: 3 mg  Qty: 10 tablet, Refills: 0    Associated Diagnoses: Closed right hip fracture, initial encounter ([address] )      aspirin 325 MG EC tablet Take 1 tablet by mouth 2 times daily for 28 days  Qty: 56 tablet, Refills: 0                Details   acetaminophen (TYLENOL) 650 MG suppository Place 1 suppository rectally every 4 hours as needed for Fever      furosemide (LASIX) 20 MG tablet Take 1 tablet by mouth daily      hyoscyamine (LEVSIN/SL) 125 MCG sublingual tablet Place 1 tablet under the tongue every 4 hours as needed for Secretions      melatonin 3 MG TABS tablet Take 2.5 mg by mouth nightly as needed      promethazine (PHENERGAN) 25 MG tablet Take 1 tablet by mouth every 4 hours as needed for Nausea      acetaminophen (TYLENOL) 325 MG tablet Take 2 tablets by mouth every 4 hours as needed for Fever or Pain      sertraline (ZOLOFT) 25 MG tablet Take 1 tablet by mouth daily  Qty: 30 tablet, Refills: 3             FOLLOW UP/INSTRUCTIONS:  This patient is instructed to follow-up with her primary care physician. Patient is instructed to follow-up with the consults listed above as directed by them. she is instructed to resume home medications and take new medications as indicated in the list above. If the patient has a recurrence of symptoms, she is instructed to go to the ED. Preparing for this patient's discharge, including paperwork, orders, instructions, and meeting with patient did require > 40 minutes.     Eduardo Barry DO     8/21/2023  7:12 AM

## 2023-08-21 NOTE — CARE COORDINATION
SS NOTE: Arrangements completed for pt to be transferred to [facility] [place] today at [employer] via [employer]. SW advised pt's niece/ Legal Guardian Micah Orozco Pt needs NO PRECERT , she did have a 3 day qualifying stay here. Pt will also need a signed PORFIRIO  and SW completed the HENs. CARROLL Walls.8/19/2023.9:45 AM.

## 2023-08-21 NOTE — PLAN OF CARE
Problem: Discharge Planning  Goal: Discharge to home or other facility with appropriate resources  Outcome: Progressing     Problem: Skin/Tissue Integrity  Goal: Absence of new skin breakdown  Outcome: Progressing     Problem: Safety - Adult  Goal: Free from fall injury  Outcome: Progressing     Problem: ABCDS Injury Assessment  Goal: Absence of physical injury  Outcome: Progressing     Problem: Pain  Goal: Verbalizes/displays adequate comfort level or baseline comfort level  Outcome: Progressing     Problem: ABCDS Injury Assessment  Goal: Absence of physical injury  Outcome: Progressing     Problem: Pain  Goal: Verbalizes/displays adequate comfort level or baseline comfort level  Outcome: Progressing     Problem: Neurosensory - Adult  Goal: Achieves maximal functionality and self care  Outcome: Progressing

## 2023-08-21 NOTE — CARE COORDINATION
LVM for [facility], anna Perez  explaining IMM letter and requested a call back.  Electronically signed by Yaneth Cleary on 8/21/2023 at 8:09 AM

## 2023-08-24 LAB — SURGICAL PATHOLOGY REPORT: NORMAL

## 2023-08-31 DIAGNOSIS — M25.552 PAIN OF LEFT HIP: Primary | ICD-10-CM

## 2023-09-05 ENCOUNTER — OFFICE VISIT (OUTPATIENT)
Dept: ORTHOPEDIC SURGERY | Age: 83
End: 2023-09-05

## 2023-09-05 VITALS — HEIGHT: 62 IN | TEMPERATURE: 98 F | WEIGHT: 104 LBS | BODY MASS INDEX: 19.14 KG/M2

## 2023-09-05 DIAGNOSIS — Z96.649 S/P HIP HEMIARTHROPLASTY: Primary | ICD-10-CM

## 2023-09-05 DIAGNOSIS — S72.001A CLOSED RIGHT HIP FRACTURE, INITIAL ENCOUNTER (HCC): ICD-10-CM

## 2023-09-05 PROCEDURE — 99024 POSTOP FOLLOW-UP VISIT: CPT | Performed by: ORTHOPAEDIC SURGERY

## 2023-09-05 NOTE — PROGRESS NOTES
Subjective:     Tabby Garcia is now 2 weeks post left total hip arthroplasty, DOS: 8/19/23. Pain is controlled with current analgesics. Medication(s) being used: Tylenol. The patient denies  none. Post op problems reported:  none. She is ambulating with walker. She is doing PT currently. Objective:      General :    alert, appears stated age, and cooperative   Gait:  Normal.   Sutures:   Sutures out. Incision:  healing well, no significant drainage, no dehiscence, no significant erythema   Tenderness:  none   Flexion ROM:  full range of motion   Extension ROM:  full range of motion   Abduction ROM:  full range of motion   DVT Evaluation:  No evidence of DVT seen on physical exam.     Imaging:  Left with no signs of aseptic loosening  Radiographic findings reviewed with patient     Assessment:     Encounter Diagnoses   Name Primary? S/P hip hemiarthroplasty Yes    Closed right hip fracture, initial encounter ([address])        Plan:     Continues current post-op course, staples were removed at today's appt  Patient is to start taking enteric coated aspirin 81 mg, 1 tablet twice daily. Patient is to continue wearing LAVON hose, on during the day and off at night. Outpatient physical therapy is to begin immediately. No bathing/swimming/hot tub for two weeks or until incision is completely closed. We will see the patient back in 6 weeks for repeat xray and evaluation. Please call office with any questions or concerns at [phone].

## 2023-10-19 DIAGNOSIS — S72.001A CLOSED RIGHT HIP FRACTURE, INITIAL ENCOUNTER (HCC): Primary | ICD-10-CM

## 2023-10-19 DIAGNOSIS — Z96.649 S/P HIP HEMIARTHROPLASTY: ICD-10-CM

## (undated) DEVICE — MARKER,SKIN,WI/RULER AND LABELS: Brand: MEDLINE

## (undated) DEVICE — HANDPIECE SET WITH BONE CLEANING TIP: Brand: INTERPULSE

## (undated) DEVICE — TUBING, SUCTION, 1/4" X 10', STRAIGHT: Brand: MEDLINE

## (undated) DEVICE — DOUBLE BASIN SET: Brand: MEDLINE INDUSTRIES, INC.

## (undated) DEVICE — PAD,ABDOMINAL,8"X10",ST,LF: Brand: MEDLINE

## (undated) DEVICE — GLOVE ORTHO 8   MSG9480

## (undated) DEVICE — BLADE,STAINLESS-STEEL,10,STRL,DISPOSABLE: Brand: MEDLINE

## (undated) DEVICE — 2108 SERIES SAGITTAL BLADE (24.8 X 0.88 X 90.5MM)

## (undated) DEVICE — 4-PORT MANIFOLD: Brand: NEPTUNE 2

## (undated) DEVICE — GAUZE,SPONGE,4"X4",16PLY,STRL,LF,10/TRAY: Brand: MEDLINE

## (undated) DEVICE — SYRINGE IRRIG 60ML SFT PLIABLE BLB EZ TO GRP 1 HND USE W/

## (undated) DEVICE — GUIDE WIRE, BALL-TIPPED, STERILE

## (undated) DEVICE — DRESSING HYDROFIBER AQUACEL AG ADVANTAGE 3.5X12 IN

## (undated) DEVICE — DRILL, AO, STERILE

## (undated) DEVICE — LAG SCREW STEP DRILL: Brand: GAMMA

## (undated) DEVICE — SUTURE SUTTAPE L40IN DIA1.3MM NONABSORBABLE WHT BLU L26.5MM AR7500

## (undated) DEVICE — CLOTH SURG PREP PREOPERATIVE CHLORHEXIDINE GLUC 2% READYPREP

## (undated) DEVICE — ONE STEP CONICAL REAMER: Brand: GAMMA

## (undated) DEVICE — BLADE ES L6IN ELASTOMERIC COAT EXT DURABLE BEND UPTO 90DEG

## (undated) DEVICE — K-WIRE

## (undated) DEVICE — APPLICATOR MEDICATED 26 CC SOLUTION HI LT ORNG CHLORAPREP

## (undated) DEVICE — DRESSING,GAUZE,XEROFORM,CURAD,5"X9",ST: Brand: CURAD

## (undated) DEVICE — SHEET,DRAPE,70X100,STERILE: Brand: MEDLINE

## (undated) DEVICE — ELECTRODE PT RET AD L9FT HI MOIST COND ADH HYDRGEL CORDED

## (undated) DEVICE — DRILL, AO SMALL: Brand: GAMMA

## (undated) DEVICE — Device

## (undated) DEVICE — PITCHER PT 1200ML W HNDL CSR WRP

## (undated) DEVICE — SURGICAL PROCEDURE PACK ORTH IV ECLIPSE STRL

## (undated) DEVICE — COVER,LIGHT HANDLE,FLX,1/PK: Brand: MEDLINE INDUSTRIES, INC.

## (undated) DEVICE — APPLICATOR MEDICATED 3 CC SOLUTION CLR STRL CHLORAPREP 930400

## (undated) DEVICE — 3M™ IOBAN™ 2 ANTIMICROBIAL INCISE DRAPE 6651EZ: Brand: IOBAN™ 2

## (undated) DEVICE — BASIC DOUBLE BASIN 2-LF: Brand: MEDLINE INDUSTRIES, INC.

## (undated) DEVICE — NDL CNTR 40CT FM MAG: Brand: MEDLINE INDUSTRIES, INC.

## (undated) DEVICE — COVER,TABLE,60X90,STERILE: Brand: MEDLINE

## (undated) DEVICE — PAD,ABDOMINAL,5"X9",ST,LF,25/BX: Brand: MEDLINE INDUSTRIES, INC.

## (undated) DEVICE — SOLUTION IRRIG 1000ML 0.9% SOD CHL USP POUR PLAS BTL

## (undated) DEVICE — DRESSING GZ W1XL8IN COT XRFRM N ADH OVERWRAP CURAD

## (undated) DEVICE — 3M™ STERI-DRAPE™ U-DRAPE 1015: Brand: STERI-DRAPE™

## (undated) DEVICE — GOWN,SIRUS,POLYRNF,RAGLAN,XL,ST,30/CS: Brand: MEDLINE

## (undated) DEVICE — PACK,BASIC I: Brand: MEDLINE

## (undated) DEVICE — SUTURE STRATAFIX SYMMETRIC SZ 1 L18IN ABSRB VLT CT1 L36CM SXPP1A404

## (undated) DEVICE — WIPES SKIN CLOTH READYPREP 9 X 10.5 IN 2% CHLORHEX GLUCONATE CHG PREOP

## (undated) DEVICE — BANDAGE COMPR W6INXL5YD SELF ADH COHESIVE CO FLX

## (undated) DEVICE — SPONGE LAP W18XL18IN WHT COT 4 PLY FLD STRUNG RADPQ DISP ST 2 PER PACK

## (undated) DEVICE — TOWEL,OR,DSP,ST,BLUE,STD,6/PK,12PK/CS: Brand: MEDLINE

## (undated) DEVICE — DRAPE 64X41IN RADIOLOGY C ARM EQUIP STER

## (undated) DEVICE — 6617 IOBAN II PATIENT ISOLATION DRAPE 5/BX,4BX/CS: Brand: STERI-DRAPE™ IOBAN™ 2

## (undated) DEVICE — BANDAGE,SELF ADHRNT,COFLEX,4"X5YD,STRL: Brand: COLABEL

## (undated) DEVICE — MEDI-VAC YANKAUER SUCTION HANDLE: Brand: CARDINAL HEALTH

## (undated) DEVICE — YANKAUER,BULB TIP,W/O VENT,RIGID,STERILE: Brand: MEDLINE